# Patient Record
Sex: FEMALE | Race: WHITE | NOT HISPANIC OR LATINO | Employment: OTHER | URBAN - METROPOLITAN AREA
[De-identification: names, ages, dates, MRNs, and addresses within clinical notes are randomized per-mention and may not be internally consistent; named-entity substitution may affect disease eponyms.]

---

## 2017-10-11 ENCOUNTER — APPOINTMENT (OUTPATIENT)
Dept: LAB | Facility: CLINIC | Age: 77
End: 2017-10-11
Payer: MEDICARE

## 2017-10-11 ENCOUNTER — GENERIC CONVERSION - ENCOUNTER (OUTPATIENT)
Dept: OTHER | Facility: OTHER | Age: 77
End: 2017-10-11

## 2017-10-11 ENCOUNTER — TRANSCRIBE ORDERS (OUTPATIENT)
Dept: LAB | Facility: CLINIC | Age: 77
End: 2017-10-11

## 2017-10-11 ENCOUNTER — ALLSCRIPTS OFFICE VISIT (OUTPATIENT)
Dept: OTHER | Facility: OTHER | Age: 77
End: 2017-10-11

## 2017-10-11 DIAGNOSIS — I10 ESSENTIAL (PRIMARY) HYPERTENSION: ICD-10-CM

## 2017-10-11 DIAGNOSIS — H91.93 HEARING LOSS OF BOTH EARS: ICD-10-CM

## 2017-10-11 DIAGNOSIS — C50.919 MALIGNANT NEOPLASM OF FEMALE BREAST (HCC): ICD-10-CM

## 2017-10-11 DIAGNOSIS — K21.9 GASTRO-ESOPHAGEAL REFLUX DISEASE WITHOUT ESOPHAGITIS: ICD-10-CM

## 2017-10-11 DIAGNOSIS — D64.9 ANEMIA: ICD-10-CM

## 2017-10-11 DIAGNOSIS — R94.6 ABNORMAL RESULTS OF THYROID FUNCTION STUDIES: ICD-10-CM

## 2017-10-11 LAB
ALBUMIN SERPL BCP-MCNC: 3.4 G/DL (ref 3.5–5)
ALP SERPL-CCNC: 69 U/L (ref 46–116)
ALT SERPL W P-5'-P-CCNC: 13 U/L (ref 12–78)
ANION GAP SERPL CALCULATED.3IONS-SCNC: 6 MMOL/L (ref 4–13)
AST SERPL W P-5'-P-CCNC: 11 U/L (ref 5–45)
BASOPHILS # BLD AUTO: 0.01 THOUSANDS/ΜL (ref 0–0.1)
BASOPHILS NFR BLD AUTO: 0 % (ref 0–1)
BILIRUB SERPL-MCNC: 0.71 MG/DL (ref 0.2–1)
BUN SERPL-MCNC: 22 MG/DL (ref 5–25)
CALCIUM SERPL-MCNC: 8.8 MG/DL (ref 8.3–10.1)
CHLORIDE SERPL-SCNC: 107 MMOL/L (ref 100–108)
CO2 SERPL-SCNC: 29 MMOL/L (ref 21–32)
CREAT SERPL-MCNC: 0.7 MG/DL (ref 0.6–1.3)
EOSINOPHIL # BLD AUTO: 0.2 THOUSAND/ΜL (ref 0–0.61)
EOSINOPHIL NFR BLD AUTO: 3 % (ref 0–6)
ERYTHROCYTE [DISTWIDTH] IN BLOOD BY AUTOMATED COUNT: 15.4 % (ref 11.6–15.1)
GFR SERPL CREATININE-BSD FRML MDRD: 83 ML/MIN/1.73SQ M
GLUCOSE SERPL-MCNC: 84 MG/DL (ref 65–140)
HCT VFR BLD AUTO: 37.6 % (ref 34.8–46.1)
HGB BLD-MCNC: 11.7 G/DL (ref 11.5–15.4)
LYMPHOCYTES # BLD AUTO: 1.59 THOUSANDS/ΜL (ref 0.6–4.47)
LYMPHOCYTES NFR BLD AUTO: 20 % (ref 14–44)
MAGNESIUM SERPL-MCNC: 2.4 MG/DL (ref 1.6–2.6)
MCH RBC QN AUTO: 27.2 PG (ref 26.8–34.3)
MCHC RBC AUTO-ENTMCNC: 31.1 G/DL (ref 31.4–37.4)
MCV RBC AUTO: 87 FL (ref 82–98)
MONOCYTES # BLD AUTO: 0.77 THOUSAND/ΜL (ref 0.17–1.22)
MONOCYTES NFR BLD AUTO: 10 % (ref 4–12)
NEUTROPHILS # BLD AUTO: 5.23 THOUSANDS/ΜL (ref 1.85–7.62)
NEUTS SEG NFR BLD AUTO: 67 % (ref 43–75)
NRBC BLD AUTO-RTO: 0 /100 WBCS
PHOSPHATE SERPL-MCNC: 3.2 MG/DL (ref 2.3–4.1)
PLATELET # BLD AUTO: 243 THOUSANDS/UL (ref 149–390)
PMV BLD AUTO: 12 FL (ref 8.9–12.7)
POTASSIUM SERPL-SCNC: 4.3 MMOL/L (ref 3.5–5.3)
PROT SERPL-MCNC: 7.3 G/DL (ref 6.4–8.2)
RBC # BLD AUTO: 4.3 MILLION/UL (ref 3.81–5.12)
SODIUM SERPL-SCNC: 142 MMOL/L (ref 136–145)
TSH SERPL DL<=0.05 MIU/L-ACNC: 2.12 UIU/ML (ref 0.36–3.74)
WBC # BLD AUTO: 7.82 THOUSAND/UL (ref 4.31–10.16)

## 2017-10-11 PROCEDURE — 36415 COLL VENOUS BLD VENIPUNCTURE: CPT

## 2017-10-11 PROCEDURE — 83735 ASSAY OF MAGNESIUM: CPT

## 2017-10-11 PROCEDURE — 80053 COMPREHEN METABOLIC PANEL: CPT

## 2017-10-11 PROCEDURE — 84100 ASSAY OF PHOSPHORUS: CPT

## 2017-10-11 PROCEDURE — 84443 ASSAY THYROID STIM HORMONE: CPT

## 2017-10-11 PROCEDURE — 85025 COMPLETE CBC W/AUTO DIFF WBC: CPT

## 2017-10-13 NOTE — PROGRESS NOTES
Assessment  1  Breast cancer (174 9) (C50 919)   2  Heart Rhythm Irregular   3  GERD (gastroesophageal reflux disease) (530 81) (K21 9)   4  Benign essential hypertension (401 1) (I10)   5  Dental caries (521 00) (K02 9)   6  Abnormal thyroid stimulating hormone (TSH) level (790 6) (R94 6)   7  Anemia (285 9) (D64 9)   8  Bilateral hearing loss (389 9) (H91 93)   9  Never smoked tobacco (V49 89) (Z78 9)    Plan  Abnormal thyroid stimulating hormone (TSH) level, Benign essential hypertension,  Breast cancer, GERD (gastroesophageal reflux disease), Heart Rhythm Irregular    · (1) COMPREHENSIVE METABOLIC PANEL; Status:Active; Requested for:11Oct2017;    · (1) MAGNESIUM; Status:Active; Requested for:11Oct2017;    · (1) PHOSPHORUS; Status:Active; Requested for:11Oct2017;    · (1) TSH; Status:Active; Requested for:11Oct2017; Anemia    · (1) CBC/PLT/DIFF; Status:Active; Requested for:11Oct2017;   Bilateral hearing loss    · *1 - [facility] AUDIOLOGY Co-Management  *  Status: Active  Requested for:  [id number]  Care Summary provided  : Yes  Dental caries    · 1 - [facility] (ORAL SURGERY ( DENTIST ONLY ) ) Co-Management  *   Status: Hold For - Scheduling  Requested for: 25WHL7446  Care Summary provided  : Yes   · 2 - [facility] (MAXILLOFACIAL SURGERY ) Co-Management  *  Status: Hold  For - Scheduling  Requested for: 13LNU7475  Care Summary provided  : Yes  Heart Rhythm Irregular    · 3 - Ramin DAS, [facility] Cardiology Co-Management  *  Status: Hold For - Scheduling   Requested for: 12HRU5694  : [facility]  are Referring to a non[facility] Preferred Provider : Patient refused suggestion for      recommended provider  Care Summary provided  : Yes    Discussion/Summary  Discussion Summary:   1 : Breast cancer: Patient does have history of breast cancer  She did have a recent biopsy  Recommend that she continue follow up with Dr Angi Castorena    2 : Irregular heart rhythm: Patient's EKG did show PVCs, PACs, some other abnormalities  There was no sign of atrial fibrillation  Would recommend that she follow with Cardiology, as well as check laboratory studies  My question is whether not some of this is related to thyroid issues  She has had some thyroid issues, as witnessed by her being on Methimazole  GERD: Stable  On omeprazole  No current symptoms  Hypertension: Stable at the moment  Blood pressure today is reasonable  No changes  Dental caries: Patient has multiple dental caries, as well as some fractured teeth  Would recommend follow with Oral surgery, as well as dental  She should likely start with dental, then proceed to the oral surgeon after that  Abnormal thyroid-stimulating hormone: Patient did have methimazole as a medication when she came here  There is no testing that shows what the abnormal level was, but her son does relate that it was low  Check TSH, and re-evaluate after that  Anemia: Noted previously Per family report  Check CBC  Bilateral hearing loss: Patient does have a significant amount of hearing loss  Unfortunately, I am very concerned that this hearing loss represents her being isolated  She lives in [place] alone, and her son is in [place]  He will, been visit with her quite often, however it would be beneficial for her if she would have improved hearing, such as with amplification and possible filtering  Chief Complaint  Chief Complaint Free Text Note Form: New Patient  Pt is in the are from [place]  She has family along today who is concerned about her health and have many questions  kw      History of Present Illness  HPI: Patient is currently transitioning to this office for coordination of care  has had breast cancer, and recently developed a new nodule/lump  She did have a biopsy done at [facility]  recently  is also on metoprolol, omeprazole, amlodipine, methimazole  Unfortunately, there is no paperwork from her prior PCP to review with this   They were unsure what exactly was going on with that, i e  her son and her were not sure about all of the medications and recent blood work  prior doc states that she is rapid a-fib and needed cardiology  was anemic 6 months ago  Review of Systems  Complete-Female:   Constitutional: as noted in HPI  Eyes: glasses  ENT: hearing loss  Cardiovascular: No complaints of slow heart rate, no fast heart rate, no chest pain, no palpitations, no leg claudication, no lower extremity edema  Respiratory: as noted in HPI  Gastrointestinal: No complaints of abdominal pain, no constipation, no nausea or vomiting, no diarrhea, no bloody stools  Genitourinary: occasional irritation, but uses a cream    Musculoskeletal: No complaints of arthralgias, no myalgias, no joint swelling or stiffness, no limb pain or swelling  Integumentary: No complaints of skin rash or lesions, no itching, no skin wounds, no breast pain or lump  Neurological: No complaints of headache, no confusion, no convulsions, no numbness, no dizziness or fainting, no tingling, no limb weakness, no difficulty walking  Psychiatric: Not suicidal, no sleep disturbance, no anxiety or depression, no change in personality, no emotional problems  Endocrine: No complaints of proptosis, no hot flashes, no muscle weakness, no deepening of the voice, no feelings of weakness  Hematologic/Lymphatic: No complaints of swollen glands, no swollen glands in the neck, does not bleed easily, does not bruise easily  Past Medical History  Active Problems And Past Medical History Reviewed: The active problems and past medical history were reviewed and updated today  Surgical History  1  History of Biopsy Breast Percutaneous Needle Core   2  History of Breast Surgery Lumpectomy   3  History of Gynecologic Services   4  History of Hip Replacement  Surgical History Reviewed: The surgical history was reviewed and updated today  Family History  Father    1   Family history of Old age  Family History Reviewed: The family history was reviewed and updated today  Social History   · Born in [facility]   · Does not use illicit drugs (L35 45) (Z78 9)   · Never smoked tobacco (V49 89) (Z78 9)   · Primary spoken language [facility]   · Rarely consumes alcohol (V49 89) (Z78 9)   ·  (V61 07) (Z63 4)  Social History Reviewed: The social history was reviewed and updated today  The social history was reviewed and is unchanged  Current Meds   1  AmLODIPine Besylate 5 MG Oral Tablet; Therapy: 65QCA1969 to Recorded   2  Caltrate 600+D Plus Minerals 600-800 MG-UNIT Oral Tablet Chewable; Therapy: 36SLP4337 to Recorded   3  MethIMAzole 5 MG Oral Tablet; Therapy: 41KJK4284 to Recorded   4  Metoprolol Tartrate 50 MG Oral Tablet; Therapy: 66KUJ1603 to Recorded   5  Omeprazole 20 MG Oral Capsule Delayed Release; Therapy: 34FWU7358 to Recorded    Allergies  1  No Known Drug Allergies    Vitals  Vital Signs    Recorded: 25APZ3496 12:07PM   Heart Rate 80   Respiration 14   Systolic 323   Diastolic 66   Height 5 ft 3 in   Weight 107 lb 2 oz   BMI Calculated 18 98   BSA Calculated 1 48     Physical Exam    Constitutional   General appearance: No acute distress, well appearing and well nourished  Eyes   Conjunctiva and lids: No swelling, erythema or discharge  Pupils and irises: Equal, round and reactive to light  Ears, Nose, Mouth, and Throat   External inspection of ears and nose: Normal     Oropharynx: Abnormal  -(poor dentition)   Pulmonary   Respiratory effort: No increased work of breathing or signs of respiratory distress  Auscultation of lungs: Clear to auscultation  Cardiovascular   Auscultation of heart: Abnormal   The heart rate was normal  The rhythm was irregularly irregular  Heart sounds: normal S1,-normal S2-and-no gallop heard  no murmurs were heard     Examination of extremities for edema and/or varicosities: Normal     Carotid pulses: Normal     Abdomen   Abdomen: Non-tender, no masses  Liver and spleen: No hepatomegaly or splenomegaly  Musculoskeletal   Gait and station: Normal     Digits and nails: Normal without clubbing or cyanosis  Inspection/palpation of joints, bones, and muscles: Normal     Neurologic   Cranial nerves: Cranial nerves 2-12 intact  Reflexes: 2+ and symmetric  Sensation: No sensory loss  Psychiatric   Orientation to person, place, and time: Normal     Mood and affect: Normal          Signatures   Electronically signed by :  NADYA Birch ; Oct 11 2017  1:33PM EST                       (Author)

## 2017-11-15 ENCOUNTER — GENERIC CONVERSION - ENCOUNTER (OUTPATIENT)
Dept: OTHER | Facility: OTHER | Age: 77
End: 2017-11-15

## 2018-01-10 NOTE — RESULT NOTES
Verified Results  (1) CBC/PLT/DIFF 11Oct2017 01:48PM Jayne Najjar Order Number: [id number]     Test Name Result Flag Reference   WBC COUNT 7 82 Thousand/uL  4 31-10 16   RBC COUNT 4 30 Million/uL  3 81-5 12   HEMOGLOBIN 11 7 g/dL  11 5-15 4   HEMATOCRIT 37 6 %  34 8-46  1   MCV 87 fL  82-98   MCH 27 2 pg  26 8-34 3   MCHC 31 1 g/dL L 31 4-37 4   RDW 15 4 % H 11 6-15 1   MPV 12 0 fL  8 9-12 7   PLATELET COUNT 520 Thousands/uL  149-390   nRBC AUTOMATED 0 /100 WBCs     NEUTROPHILS RELATIVE PERCENT 67 %  43-75   LYMPHOCYTES RELATIVE PERCENT 20 %  14-44   MONOCYTES RELATIVE PERCENT 10 %  4-12   EOSINOPHILS RELATIVE PERCENT 3 %  0-6   BASOPHILS RELATIVE PERCENT 0 %  0-1   NEUTROPHILS ABSOLUTE COUNT 5 23 Thousands/? ??L  1 85-7 62   LYMPHOCYTES ABSOLUTE COUNT 1 59 Thousands/? ??L  0 60-4 47   MONOCYTES ABSOLUTE COUNT 0 77 Thousand/? ??L  0 17-1 22   EOSINOPHILS ABSOLUTE COUNT 0 20 Thousand/? ??L  0 00-0 61   BASOPHILS ABSOLUTE COUNT 0 01 Thousands/? ??L  0 00-0 10     (1) TSH 11Oct2017 01:48PM S[facility] Order Number: [id number]     Test Name Result Flag Reference   TSH 2 120 uIU/mL  0 358-3 740   Patients undergoing fluorescein dye angiography may retain small amounts of fluorescein in the body for 48-72 hours post procedure  Samples containing fluorescein can produce falsely depressed TSH values  If the patient had this procedure,a specimen should be resubmitted post fluorescein clearance  The recommended reference ranges for TSH during pregnancy are as follows:  First trimester 0 1 to 2 5 uIU/mL  Second trimester  0 2 to 3 0 uIU/mL  Third trimester 0 3 to 3 0 uIU/m     (1) COMPREHENSIVE METABOLIC PANEL 34RFC2868 09:30OC [employer] Order Number: [id number]     Test Name Result Flag Reference   GLUCOSE,RANDM 84 mg/dL     If the patient is fasting, the ADA then defines impaired fasting glucose as > 100 mg/dL and diabetes as > or equal to 123 mg/dL    Specimen collection should occur prior to Sulfasalazine administration due to the potential for falsely depressed results  Specimen collection should occur prior to Sulfapyridine administration due to the potential for falsely elevated results  SODIUM 142 mmol/L  136-145   POTASSIUM 4 3 mmol/L  3 5-5 3   CHLORIDE 107 mmol/L  100-108   CARBON DIOXIDE 29 mmol/L  21-32   ANION GAP (CALC) 6 mmol/L  4-13   BLOOD UREA NITROGEN 22 mg/dL  5-25   CREATININE 0 70 mg/dL  0 60-1 30   Standardized to IDMS reference method   CALCIUM 8 8 mg/dL  8 3-10 1   BILI, TOTAL 0 71 mg/dL  0 20-1 00   ALK PHOSPHATAS 69 U/L     ALT (SGPT) 13 U/L  12-78   Specimen collection should occur prior to Sulfasalazine and/or Sulfapyridine administration due to the potential for falsely depressed results  AST(SGOT) 11 U/L  5-45   Specimen collection should occur prior to Sulfasalazine administration due to the potential for falsely depressed results  ALBUMIN 3 4 g/dL L 3 5-5 0   TOTAL PROTEIN 7 3 g/dL  6 4-8 2   eGFR 83 ml/min/1 73sq m     National Kidney Disease Education Program recommendations are as follows:  GFR calculation is accurate only with a steady state creatinine  Chronic Kidney disease less than 60 ml/min/1 73 sq  meters  Kidney failure less than 15 ml/min/1 73 sq  meters       (1) MAGNESIUM [id number] 01:48PM [facility] Order Number: [id number]     Test Name Result Flag Reference   MAGNESIUM 2 4 mg/dL  1 6-2 6     (1) PHOSPHORUS [id number] 01:48PM Magali Janet Order Number: [id number]     Test Name Result Flag Reference   PHOSPHORUS 3 2 mg/dL  2 3-4 1

## 2018-01-13 VITALS
HEIGHT: 63 IN | BODY MASS INDEX: 18.98 KG/M2 | WEIGHT: 107.13 LBS | DIASTOLIC BLOOD PRESSURE: 66 MMHG | HEART RATE: 80 BPM | SYSTOLIC BLOOD PRESSURE: 124 MMHG | RESPIRATION RATE: 14 BRPM

## 2018-02-08 ENCOUNTER — TELEPHONE (OUTPATIENT)
Dept: FAMILY MEDICINE CLINIC | Facility: CLINIC | Age: 78
End: 2018-02-08

## 2018-02-08 NOTE — TELEPHONE ENCOUNTER
Just an FYI for this patient's appointment tomorrow (2/9/18), one of the nurses stated that when she was in the hospital they gave her Trazodone to help her sleep  She is out of the hospital & not taking it anymore  But patient is saying that she is complaining of sleep problems  The nurse just wanted to let us know, to use this as a possible option  Thank you!

## 2018-02-09 ENCOUNTER — OFFICE VISIT (OUTPATIENT)
Dept: FAMILY MEDICINE CLINIC | Facility: CLINIC | Age: 78
End: 2018-02-09
Payer: MEDICARE

## 2018-02-09 ENCOUNTER — TRANSCRIBE ORDERS (OUTPATIENT)
Dept: LAB | Facility: CLINIC | Age: 78
End: 2018-02-09

## 2018-02-09 ENCOUNTER — LAB (OUTPATIENT)
Dept: LAB | Facility: CLINIC | Age: 78
End: 2018-02-09
Payer: MEDICARE

## 2018-02-09 VITALS
DIASTOLIC BLOOD PRESSURE: 58 MMHG | BODY MASS INDEX: 19.38 KG/M2 | WEIGHT: 109.4 LBS | HEIGHT: 63 IN | HEART RATE: 80 BPM | SYSTOLIC BLOOD PRESSURE: 100 MMHG

## 2018-02-09 DIAGNOSIS — I10 BENIGN ESSENTIAL HYPERTENSION: Primary | ICD-10-CM

## 2018-02-09 DIAGNOSIS — F51.04 PSYCHOPHYSIOLOGICAL INSOMNIA: ICD-10-CM

## 2018-02-09 DIAGNOSIS — K21.9 GASTROESOPHAGEAL REFLUX DISEASE WITHOUT ESOPHAGITIS: ICD-10-CM

## 2018-02-09 DIAGNOSIS — S72.001D CLOSED FRACTURE OF RIGHT HIP WITH ROUTINE HEALING: ICD-10-CM

## 2018-02-09 DIAGNOSIS — C50.512 MALIGNANT NEOPLASM OF LOWER-OUTER QUADRANT OF LEFT FEMALE BREAST, UNSPECIFIED ESTROGEN RECEPTOR STATUS (HCC): Primary | ICD-10-CM

## 2018-02-09 DIAGNOSIS — C50.919 MALIGNANT NEOPLASM OF FEMALE BREAST, UNSPECIFIED ESTROGEN RECEPTOR STATUS, UNSPECIFIED LATERALITY, UNSPECIFIED SITE OF BREAST (HCC): ICD-10-CM

## 2018-02-09 PROBLEM — I49.9 IRREGULAR HEART RHYTHM: Status: ACTIVE | Noted: 2017-10-11

## 2018-02-09 PROBLEM — D64.9 ANEMIA: Status: ACTIVE | Noted: 2017-10-11

## 2018-02-09 PROBLEM — R79.89 ABNORMAL THYROID STIMULATING HORMONE (TSH) LEVEL: Status: ACTIVE | Noted: 2017-10-11

## 2018-02-09 PROBLEM — G47.00 INSOMNIA: Status: ACTIVE | Noted: 2018-02-09

## 2018-02-09 PROBLEM — H91.93 BILATERAL HEARING LOSS: Status: ACTIVE | Noted: 2017-10-11

## 2018-02-09 PROBLEM — K02.9 DENTAL CARIES: Status: ACTIVE | Noted: 2017-10-11

## 2018-02-09 LAB
ALBUMIN SERPL BCP-MCNC: 3.2 G/DL (ref 3.5–5)
ALP SERPL-CCNC: 55 U/L (ref 46–116)
ALT SERPL W P-5'-P-CCNC: 11 U/L (ref 12–78)
ANION GAP SERPL CALCULATED.3IONS-SCNC: 5 MMOL/L (ref 4–13)
AST SERPL W P-5'-P-CCNC: 11 U/L (ref 5–45)
BASOPHILS # BLD AUTO: 0.02 THOUSANDS/ΜL (ref 0–0.1)
BASOPHILS NFR BLD AUTO: 0 % (ref 0–1)
BILIRUB SERPL-MCNC: 0.38 MG/DL (ref 0.2–1)
BUN SERPL-MCNC: 24 MG/DL (ref 5–25)
CALCIUM SERPL-MCNC: 8.9 MG/DL (ref 8.3–10.1)
CHLORIDE SERPL-SCNC: 107 MMOL/L (ref 100–108)
CO2 SERPL-SCNC: 30 MMOL/L (ref 21–32)
CREAT SERPL-MCNC: 0.78 MG/DL (ref 0.6–1.3)
CRP SERPL QL: <3 MG/L
EOSINOPHIL # BLD AUTO: 0.24 THOUSAND/ΜL (ref 0–0.61)
EOSINOPHIL NFR BLD AUTO: 3 % (ref 0–6)
ERYTHROCYTE [DISTWIDTH] IN BLOOD BY AUTOMATED COUNT: 14.9 % (ref 11.6–15.1)
ERYTHROCYTE [SEDIMENTATION RATE] IN BLOOD: 38 MM/HOUR (ref 0–20)
GFR SERPL CREATININE-BSD FRML MDRD: 73 ML/MIN/1.73SQ M
GLUCOSE SERPL-MCNC: 97 MG/DL (ref 65–140)
HCT VFR BLD AUTO: 35.4 % (ref 34.8–46.1)
HGB BLD-MCNC: 11.1 G/DL (ref 11.5–15.4)
LYMPHOCYTES # BLD AUTO: 1.7 THOUSANDS/ΜL (ref 0.6–4.47)
LYMPHOCYTES NFR BLD AUTO: 21 % (ref 14–44)
MCH RBC QN AUTO: 28.5 PG (ref 26.8–34.3)
MCHC RBC AUTO-ENTMCNC: 31.4 G/DL (ref 31.4–37.4)
MCV RBC AUTO: 91 FL (ref 82–98)
MONOCYTES # BLD AUTO: 0.76 THOUSAND/ΜL (ref 0.17–1.22)
MONOCYTES NFR BLD AUTO: 9 % (ref 4–12)
NEUTROPHILS # BLD AUTO: 5.55 THOUSANDS/ΜL (ref 1.85–7.62)
NEUTS SEG NFR BLD AUTO: 67 % (ref 43–75)
NRBC BLD AUTO-RTO: 0 /100 WBCS
PLATELET # BLD AUTO: 269 THOUSANDS/UL (ref 149–390)
PMV BLD AUTO: 11.3 FL (ref 8.9–12.7)
POTASSIUM SERPL-SCNC: 3.9 MMOL/L (ref 3.5–5.3)
PROT SERPL-MCNC: 6.8 G/DL (ref 6.4–8.2)
RBC # BLD AUTO: 3.89 MILLION/UL (ref 3.81–5.12)
SODIUM SERPL-SCNC: 142 MMOL/L (ref 136–145)
WBC # BLD AUTO: 8.3 THOUSAND/UL (ref 4.31–10.16)

## 2018-02-09 PROCEDURE — 36415 COLL VENOUS BLD VENIPUNCTURE: CPT

## 2018-02-09 PROCEDURE — 86140 C-REACTIVE PROTEIN: CPT

## 2018-02-09 PROCEDURE — 80053 COMPREHEN METABOLIC PANEL: CPT

## 2018-02-09 PROCEDURE — 99214 OFFICE O/P EST MOD 30 MIN: CPT | Performed by: FAMILY MEDICINE

## 2018-02-09 PROCEDURE — 85652 RBC SED RATE AUTOMATED: CPT

## 2018-02-09 PROCEDURE — 85025 COMPLETE CBC W/AUTO DIFF WBC: CPT

## 2018-02-09 RX ORDER — ESTRADIOL 0.1 MG/G
CREAM VAGINAL
Qty: 42.5 G | Refills: 0 | Status: SHIPPED | OUTPATIENT
Start: 2018-02-09 | End: 2018-08-24 | Stop reason: ALTCHOICE

## 2018-02-09 RX ORDER — ASCORBIC ACID 250 MG
250 TABLET ORAL DAILY
Refills: 0
Start: 2018-02-09

## 2018-02-09 RX ORDER — FERROUS SULFATE TAB EC 324 MG (65 MG FE EQUIVALENT) 324 (65 FE) MG
324 TABLET DELAYED RESPONSE ORAL
Refills: 0
Start: 2018-02-09

## 2018-02-09 RX ORDER — TAMOXIFEN CITRATE 20 MG/1
20 TABLET ORAL DAILY
Refills: 0 | COMMUNITY
Start: 2018-01-06 | End: 2018-08-16 | Stop reason: SDUPTHER

## 2018-02-09 RX ORDER — TRAZODONE HYDROCHLORIDE 50 MG/1
50 TABLET ORAL
Refills: 0 | Status: CANCELLED
Start: 2018-02-09

## 2018-02-09 RX ORDER — RANITIDINE 150 MG/1
150 CAPSULE ORAL 2 TIMES DAILY
Qty: 180 CAPSULE | Refills: 3 | Status: SHIPPED | OUTPATIENT
Start: 2018-02-09 | End: 2021-03-08 | Stop reason: ALTCHOICE

## 2018-02-09 RX ORDER — CA/D3/MAG OX/ZINC/COP/MANG/BOR 600 MG-800
TABLET,CHEWABLE ORAL
COMMUNITY
Start: 2017-10-11

## 2018-02-09 RX ORDER — METHIMAZOLE 10 MG/1
10 TABLET ORAL EVERY MORNING
Refills: 0 | COMMUNITY
Start: 2018-02-03 | End: 2018-03-16 | Stop reason: SDUPTHER

## 2018-02-09 RX ORDER — TRAZODONE HYDROCHLORIDE 50 MG/1
50 TABLET ORAL
Qty: 30 TABLET | Refills: 2 | Status: SHIPPED | OUTPATIENT
Start: 2018-02-09 | End: 2018-05-11 | Stop reason: SDUPTHER

## 2018-02-09 RX ORDER — METOPROLOL SUCCINATE 25 MG/1
TABLET, EXTENDED RELEASE ORAL
Refills: 0 | COMMUNITY
Start: 2018-02-01 | End: 2018-03-16 | Stop reason: SDUPTHER

## 2018-02-09 NOTE — ASSESSMENT & PLAN NOTE
Patient seems to be doing quite well after her hip replacement  At this point, I would not recommend any changes other than continue her current treatments  In the future we are going to need to check vitamin-D level, as well as DEXA scan

## 2018-02-09 NOTE — ASSESSMENT & PLAN NOTE
Patient has increasing amount of gas and belching  Recommend trial of Zantac  Her son mentioned that she tried omeprazole before and it did not seem to help  Also, with the recent hip fracture am concerned that omeprazole would not be the right option for her

## 2018-02-09 NOTE — ASSESSMENT & PLAN NOTE
Patient does appear to be having some insomnia issues  She was on trazodone from discharge from hospital in [place], but it did seem to help her quite a bit with the sleep  Since then, she has not been able to sleep as well  Will renewed today, and I would also agree that it would help as far as depression anxiety symptoms

## 2018-03-16 DIAGNOSIS — I10 BENIGN ESSENTIAL HYPERTENSION: ICD-10-CM

## 2018-03-16 DIAGNOSIS — R79.89 ABNORMAL THYROID STIMULATING HORMONE (TSH) LEVEL: Primary | ICD-10-CM

## 2018-03-16 RX ORDER — METOPROLOL SUCCINATE 25 MG/1
25 TABLET, EXTENDED RELEASE ORAL EVERY MORNING
Qty: 30 TABLET | Refills: 5 | Status: SHIPPED | OUTPATIENT
Start: 2018-03-16

## 2018-03-16 RX ORDER — METHIMAZOLE 10 MG/1
10 TABLET ORAL EVERY MORNING
Qty: 30 TABLET | Refills: 5 | Status: SHIPPED | OUTPATIENT
Start: 2018-03-16 | End: 2018-05-11 | Stop reason: SDUPTHER

## 2018-03-16 RX ORDER — NYSTATIN 100000 U/G
OINTMENT TOPICAL 2 TIMES DAILY
Qty: 30 G | Refills: 0 | Status: SHIPPED | OUTPATIENT
Start: 2018-03-16

## 2018-03-16 NOTE — TELEPHONE ENCOUNTER
Pts son is re [facility] [place] a refill for pts Nystatin Cream which I do not see this on her med sheet, If we can refill what directions and how many?

## 2018-05-11 DIAGNOSIS — F51.04 PSYCHOPHYSIOLOGICAL INSOMNIA: ICD-10-CM

## 2018-05-11 DIAGNOSIS — R79.89 ABNORMAL THYROID STIMULATING HORMONE (TSH) LEVEL: ICD-10-CM

## 2018-05-11 RX ORDER — TRAZODONE HYDROCHLORIDE 50 MG/1
TABLET ORAL
Qty: 30 TABLET | Refills: 0 | Status: SHIPPED | OUTPATIENT
Start: 2018-05-11 | End: 2018-06-17 | Stop reason: SDUPTHER

## 2018-05-11 RX ORDER — METHIMAZOLE 10 MG/1
TABLET ORAL
Qty: 30 TABLET | Refills: 0 | Status: SHIPPED | OUTPATIENT
Start: 2018-05-11 | End: 2021-01-24 | Stop reason: SDUPTHER

## 2018-06-17 DIAGNOSIS — F51.04 PSYCHOPHYSIOLOGICAL INSOMNIA: ICD-10-CM

## 2018-06-18 RX ORDER — TRAZODONE HYDROCHLORIDE 50 MG/1
TABLET ORAL
Qty: 30 TABLET | Refills: 0 | Status: SHIPPED | OUTPATIENT
Start: 2018-06-18 | End: 2018-09-21 | Stop reason: SDUPTHER

## 2018-08-07 PROBLEM — Z17.0 MALIGNANT NEOPLASM OF LOWER-OUTER QUADRANT OF LEFT BREAST OF FEMALE, ESTROGEN RECEPTOR POSITIVE (HCC): Chronic | Status: ACTIVE | Noted: 2017-10-11

## 2018-08-07 PROBLEM — C50.512 MALIGNANT NEOPLASM OF LOWER-OUTER QUADRANT OF LEFT BREAST OF FEMALE, ESTROGEN RECEPTOR POSITIVE (HCC): Chronic | Status: ACTIVE | Noted: 2017-10-11

## 2018-08-07 PROBLEM — M81.0 AGE-RELATED OSTEOPOROSIS WITHOUT CURRENT PATHOLOGICAL FRACTURE: Chronic | Status: ACTIVE | Noted: 2018-08-07

## 2018-08-16 DIAGNOSIS — Z17.0 MALIGNANT NEOPLASM OF LOWER-OUTER QUADRANT OF LEFT BREAST OF FEMALE, ESTROGEN RECEPTOR POSITIVE (HCC): Primary | ICD-10-CM

## 2018-08-16 DIAGNOSIS — C50.512 MALIGNANT NEOPLASM OF LOWER-OUTER QUADRANT OF LEFT BREAST OF FEMALE, ESTROGEN RECEPTOR POSITIVE (HCC): Primary | ICD-10-CM

## 2018-08-16 RX ORDER — TAMOXIFEN CITRATE 20 MG/1
TABLET ORAL
Qty: 90 TABLET | Refills: 4 | Status: SHIPPED | OUTPATIENT
Start: 2018-08-16 | End: 2019-04-08 | Stop reason: SDUPTHER

## 2018-08-22 ENCOUNTER — TRANSCRIBE ORDERS (OUTPATIENT)
Dept: LAB | Facility: CLINIC | Age: 78
End: 2018-08-22

## 2018-08-22 ENCOUNTER — APPOINTMENT (OUTPATIENT)
Dept: LAB | Facility: CLINIC | Age: 78
End: 2018-08-22
Payer: MEDICARE

## 2018-08-22 DIAGNOSIS — C50.512 MALIGNANT NEOPLASM OF LOWER-OUTER QUADRANT OF LEFT FEMALE BREAST, UNSPECIFIED ESTROGEN RECEPTOR STATUS (HCC): Primary | ICD-10-CM

## 2018-08-22 LAB
ALBUMIN SERPL BCP-MCNC: 3.1 G/DL (ref 3.5–5)
ALP SERPL-CCNC: 35 U/L (ref 46–116)
ALT SERPL W P-5'-P-CCNC: 10 U/L (ref 12–78)
ANION GAP SERPL CALCULATED.3IONS-SCNC: 8 MMOL/L (ref 4–13)
AST SERPL W P-5'-P-CCNC: 9 U/L (ref 5–45)
BASOPHILS # BLD AUTO: 0.03 THOUSANDS/ΜL (ref 0–0.1)
BASOPHILS NFR BLD AUTO: 0 % (ref 0–1)
BILIRUB SERPL-MCNC: 0.57 MG/DL (ref 0.2–1)
BUN SERPL-MCNC: 22 MG/DL (ref 5–25)
CALCIUM SERPL-MCNC: 8.5 MG/DL (ref 8.3–10.1)
CHLORIDE SERPL-SCNC: 112 MMOL/L (ref 100–108)
CO2 SERPL-SCNC: 27 MMOL/L (ref 21–32)
CREAT SERPL-MCNC: 0.82 MG/DL (ref 0.6–1.3)
CRP SERPL QL: <3 MG/L
EOSINOPHIL # BLD AUTO: 0.2 THOUSAND/ΜL (ref 0–0.61)
EOSINOPHIL NFR BLD AUTO: 3 % (ref 0–6)
ERYTHROCYTE [DISTWIDTH] IN BLOOD BY AUTOMATED COUNT: 14.2 % (ref 11.6–15.1)
ERYTHROCYTE [SEDIMENTATION RATE] IN BLOOD: 26 MM/HOUR (ref 0–20)
FERRITIN SERPL-MCNC: 256 NG/ML (ref 8–388)
GFR SERPL CREATININE-BSD FRML MDRD: 68 ML/MIN/1.73SQ M
GLUCOSE SERPL-MCNC: 104 MG/DL (ref 65–140)
HCT VFR BLD AUTO: 35.2 % (ref 34.8–46.1)
HGB BLD-MCNC: 10.7 G/DL (ref 11.5–15.4)
IMM GRANULOCYTES # BLD AUTO: 0.03 THOUSAND/UL (ref 0–0.2)
IMM GRANULOCYTES NFR BLD AUTO: 0 % (ref 0–2)
IRON SATN MFR SERPL: 40 %
IRON SERPL-MCNC: 81 UG/DL (ref 50–170)
LDH SERPL-CCNC: 148 U/L (ref 81–234)
LYMPHOCYTES # BLD AUTO: 1.35 THOUSANDS/ΜL (ref 0.6–4.47)
LYMPHOCYTES NFR BLD AUTO: 18 % (ref 14–44)
MCH RBC QN AUTO: 28.8 PG (ref 26.8–34.3)
MCHC RBC AUTO-ENTMCNC: 30.4 G/DL (ref 31.4–37.4)
MCV RBC AUTO: 95 FL (ref 82–98)
MONOCYTES # BLD AUTO: 0.76 THOUSAND/ΜL (ref 0.17–1.22)
MONOCYTES NFR BLD AUTO: 10 % (ref 4–12)
NEUTROPHILS # BLD AUTO: 5.12 THOUSANDS/ΜL (ref 1.85–7.62)
NEUTS SEG NFR BLD AUTO: 69 % (ref 43–75)
NRBC BLD AUTO-RTO: 0 /100 WBCS
PLATELET # BLD AUTO: 194 THOUSANDS/UL (ref 149–390)
PMV BLD AUTO: 11.9 FL (ref 8.9–12.7)
POTASSIUM SERPL-SCNC: 4 MMOL/L (ref 3.5–5.3)
PROT SERPL-MCNC: 6.6 G/DL (ref 6.4–8.2)
RBC # BLD AUTO: 3.71 MILLION/UL (ref 3.81–5.12)
SODIUM SERPL-SCNC: 147 MMOL/L (ref 136–145)
TIBC SERPL-MCNC: 205 UG/DL (ref 250–450)
VIT B12 SERPL-MCNC: 546 PG/ML (ref 100–900)
WBC # BLD AUTO: 7.49 THOUSAND/UL (ref 4.31–10.16)

## 2018-08-22 PROCEDURE — 36415 COLL VENOUS BLD VENIPUNCTURE: CPT

## 2018-08-22 PROCEDURE — 82728 ASSAY OF FERRITIN: CPT

## 2018-08-22 PROCEDURE — 80053 COMPREHEN METABOLIC PANEL: CPT

## 2018-08-22 PROCEDURE — 85025 COMPLETE CBC W/AUTO DIFF WBC: CPT

## 2018-08-22 PROCEDURE — 86140 C-REACTIVE PROTEIN: CPT

## 2018-08-22 PROCEDURE — 83550 IRON BINDING TEST: CPT

## 2018-08-22 PROCEDURE — 83615 LACTATE (LD) (LDH) ENZYME: CPT

## 2018-08-22 PROCEDURE — 82607 VITAMIN B-12: CPT

## 2018-08-22 PROCEDURE — 85652 RBC SED RATE AUTOMATED: CPT

## 2018-08-22 PROCEDURE — 83918 ORGANIC ACIDS TOTAL QUANT: CPT

## 2018-08-22 PROCEDURE — 83540 ASSAY OF IRON: CPT

## 2018-08-23 ENCOUNTER — HOSPITAL ENCOUNTER (OUTPATIENT)
Dept: INFUSION CENTER | Facility: HOSPITAL | Age: 78
Discharge: HOME | End: 2018-08-23

## 2018-08-24 ENCOUNTER — HOSPITAL ENCOUNTER (OUTPATIENT)
Dept: INFUSION CENTER | Facility: HOSPITAL | Age: 78
Discharge: HOME | End: 2018-08-24
Payer: MEDICARE

## 2018-08-24 ENCOUNTER — OFFICE VISIT (OUTPATIENT)
Dept: HEMATOLOGY ONCOLOGY | Facility: CLINIC | Age: 78
End: 2018-08-24
Payer: MEDICARE

## 2018-08-24 VITALS
DIASTOLIC BLOOD PRESSURE: 80 MMHG | HEIGHT: 60 IN | BODY MASS INDEX: 23.36 KG/M2 | OXYGEN SATURATION: 99 % | SYSTOLIC BLOOD PRESSURE: 138 MMHG | TEMPERATURE: 98.7 F | WEIGHT: 119 LBS | RESPIRATION RATE: 18 BRPM | HEART RATE: 62 BPM

## 2018-08-24 DIAGNOSIS — Z17.0 MALIGNANT NEOPLASM OF LOWER-OUTER QUADRANT OF LEFT BREAST OF FEMALE, ESTROGEN RECEPTOR POSITIVE (HCC): Primary | Chronic | ICD-10-CM

## 2018-08-24 DIAGNOSIS — D64.9 ANEMIA, UNSPECIFIED TYPE: ICD-10-CM

## 2018-08-24 DIAGNOSIS — C50.512 MALIGNANT NEOPLASM OF LOWER-OUTER QUADRANT OF LEFT BREAST OF FEMALE, ESTROGEN RECEPTOR POSITIVE (HCC): Primary | Chronic | ICD-10-CM

## 2018-08-24 PROCEDURE — 99213 OFFICE O/P EST LOW 20 MIN: CPT | Performed by: INTERNAL MEDICINE

## 2018-08-24 PROCEDURE — 96401 CHEMO ANTI-NEOPL SQ/IM: CPT

## 2018-08-24 RX ADMIN — DENOSUMAB 60 MG: 60 INJECTION SUBCUTANEOUS at 13:06

## 2018-08-24 NOTE — PROGRESS NOTES
Patient tolerated Prolia to right upper arm sq without complications  Patient calcium 8 5 from 8-22-18, dexa done 2-16-18  Patient to see Dr Kai Moreno today and will sched next appt at that time

## 2018-08-24 NOTE — PROGRESS NOTES
Hematology/Oncology Outpatient Follow-up  Duane Skillern 77 y o  female 1940 [id number]    Date:  8/24/2018      Assessment and Plan:  1  Malignant neoplasm of lower-outer quadrant of left breast of female, estrogen receptor positive ([address] )  Patient is tolerating tamoxifen relatively well without any hint of progression of the mass in the left breast according to the most recent ultrasound done at the breast surgeons office  The patient will be continue on the current treatment without any changes we will continue with the Prolia injection on a every 6 months basis  - CBC and differential; Future  - Comprehensive metabolic panel; Future  - Vitamin D 25 hydroxy    2  Anemia, unspecified type  She does not seem to be iron deficient vitamin B12 deficient  She was asked to follow up with her primary care physician regarding her anemia   - CBC and differential; Future  - Comprehensive metabolic panel; Future      HPI:  The patient came in today for a follow-up visit  She continues to be on the tamoxifen on a daily basis without any significant side effects  She was recently seen by her breast surgeon who performed an ultrasound of the left breast   The mass at the 5 o'clock position seems to be about the same size at 1 cm according to the son  She had a bone density scan in February 2018 which showed osteoporosis of the lumbar spine and forearm  She takes vitamin-D supplements on a daily basis  She had a limited workup for her mild anemia which showed normal iron panel and vitamin B12 level  She denied bleeding from any site  Oncology History    Patient has a remote history of left breast cancer diagnosed 1998 status post lumpectomy, adjuvant radiation, and 5 years worth of endocrine therapy with tamoxifen  Her family history seems to be negative for breast cancer      She was found to have a lump on the left breast and mammogram was done on August 2, 2017, which showed a 1 2 cm lesion at 5 o'clock about 3 cm from the nipple  Ultrasound was done which confirmed the abnormal findings  Ultrasound-guided core biopsy was done October 6, 2017, the pathology revealed high-grade 3 infiltrating ductal carcinoma  The tumor size of the core biopsy measured 1 0 cm with prominent in situ component without lymphovascular invasion  The ER was 98% positive, IA 20% positive, her 2 Марина negative by FISH  The patient was evaluated for surgical approach of her left breast cancer and was found not to be a candidate for surgical intervention due to her comorbidities  She was started on tamoxifen in November 2017 which she is tolerating pretty well  Patient  DEXA scan 02/16/2018 which showed osteoporosis of the lumbar spine and the left forearm  The hips are not assessed due to bilateral hip replacements  She was started on Prolia 60 mg subcu every 6 months on 02/22/2018  Malignant neoplasm of lower-outer quadrant of left breast of female, estrogen receptor positive ([facility] [address] )    10/6/2017 Initial Diagnosis     Malignant neoplasm of lower-outer quadrant of left breast of female, estrogen receptor positive ([facility] [address] )         11/2017 -  Hormone Therapy     Started tamoxifen  (was also on tamoxifen in the past 5771-7576)            Interval history:    ROS: Review of Systems   Constitutional: Negative for activity change, appetite change, chills, diaphoresis, fatigue, fever and unexpected weight change  HENT: Negative for congestion, dental problem, ear discharge, ear pain, facial swelling, hearing loss, mouth sores, nosebleeds, postnasal drip, sore throat, tinnitus and trouble swallowing  Eyes: Negative for discharge, redness, itching and visual disturbance  Respiratory: Negative for cough, chest tightness, shortness of breath and wheezing  Cardiovascular: Negative for chest pain, palpitations and leg swelling     Gastrointestinal: Negative for abdominal distention, abdominal pain, anal bleeding, blood in stool, constipation, diarrhea, nausea and vomiting  Genitourinary: Negative for difficulty urinating, dysuria, flank pain, frequency, hematuria and urgency  Musculoskeletal: Negative for arthralgias, back pain, gait problem, joint swelling, myalgias and neck pain  Skin: Negative for color change, pallor, rash and wound  Neurological: Negative for dizziness, syncope, speech difficulty, weakness, light-headedness, numbness and headaches  Hematological: Negative for adenopathy  Does not bruise/bleed easily  Psychiatric/Behavioral: Negative for agitation, behavioral problems, confusion and sleep disturbance  Past Medical History:   Diagnosis Date    Age-related osteoporosis without current pathological fracture 8/7/2018    [facility] [address] [address]     Hypertension     Hyperthyroidism        Past Surgical History:   Procedure Laterality Date    BREAST BIOPSY      percutaneous needle core    BREAST LUMPECTOMY  1998    [facility]    TOTAL HIP ARTHROPLASTY Left     TUMOR REMOVAL  1975    gynecologic services to remove a tumor       Social History     Social History    Marital status:       Spouse name: N/A    Number of children: N/A    Years of education: N/A     Social History Main Topics    Smoking status: Never Smoker    Smokeless tobacco: Never Used    Alcohol use No    Drug use: No    Sexual activity: Not on file     Other Topics Concern    Not on file     Social History Narrative    Born in [facility]           Family History   Problem Relation Age of Onset   Yoni Hero Other Father         old age       No Known Allergies      Current Outpatient Prescriptions:     ascorbic acid (VITAMIN C) 250 MG tablet, Take 1 tablet (250 mg total) by mouth daily, Disp: , Rfl: 0    Calcium Carbonate-Vit D-Min (CALTRATE 600+D PLUS MINERALS) 600-800 MG-UNIT CHEW, Chew, Disp: , Rfl:     ferrous sulfate 324 (65 Fe) mg, Take 1 tablet (324 mg total) by mouth 2 (two) times a day before meals, Disp: , Rfl: 0    methimazole (TAPAZOLE) 10 mg tablet, TAKE 1 TABLET BY MOUTH IN THE MORNING, Disp: 30 tablet, Rfl: 0    metoprolol succinate (TOPROL-XL) 25 mg 24 hr tablet, Take 1 tablet (25 mg total) by mouth every morning, Disp: 30 tablet, Rfl: 5    nystatin (MYCOSTATIN) ointment, Apply topically 2 (two) times a day, Disp: 30 g, Rfl: 0    ranitidine (ZANTAC) 150 MG capsule, Take 1 capsule (150 mg total) by mouth 2 (two) times a day, Disp: 180 capsule, Rfl: 3    tamoxifen (NOLVADEX) 20 mg tablet, TAKE 1 TABLET DAILY, Disp: 90 tablet, Rfl: 4    traZODone (DESYREL) 50 mg tablet, TAKE 1 TABLET BY MOUTH DAILY AT BEDTIME, Disp: 30 tablet, Rfl: 0  No current facility-administered medications for this visit  Physical Exam:  /80 (BP Location: Right arm, Patient Position: Sitting, Cuff Size: Adult)   Pulse 62   Temp 98 7 °F (37 1 °C) (Tympanic)   Resp 18   Ht 5' (1 524 m)   Wt 54 kg (119 lb)   SpO2 99%   BMI 23 24 kg/m²     Physical Exam   Constitutional: She is oriented to person, place, and time  She appears well-developed and well-nourished  No distress  HENT:   Head: Normocephalic and atraumatic  Nose: Nose normal    Mouth/Throat: Oropharynx is clear and moist    Poor dentition   Eyes: Conjunctivae and EOM are normal  Pupils are equal, round, and reactive to light  Right eye exhibits no discharge  Left eye exhibits no discharge  No scleral icterus  Neck: Normal range of motion  Neck supple  No JVD present  No tracheal deviation present  No thyromegaly present  Cardiovascular: Normal rate, regular rhythm and normal heart sounds  Exam reveals no friction rub  No murmur heard  Left breast examination revealed a 1 cm mass in the 5 o'clock position  Pulmonary/Chest: Effort normal and breath sounds normal  No stridor  No respiratory distress  She has no wheezes  She has no rales  She exhibits no tenderness  Abdominal: Soft  Bowel sounds are normal  She exhibits no distension and no mass   There is no hepatosplenomegaly, splenomegaly or hepatomegaly  There is no tenderness  There is no rebound and no guarding  Musculoskeletal: Normal range of motion  She exhibits no edema, tenderness or deformity  Lymphadenopathy:     She has no cervical adenopathy  Neurological: She is alert and oriented to person, place, and time  She has normal reflexes  No cranial nerve deficit  Coordination normal    Skin: Skin is warm and dry  No rash noted  She is not diaphoretic  No erythema  No pallor  Psychiatric: She has a normal mood and affect  Her behavior is normal  Judgment and thought content normal          Labs:  Lab Results   Component Value Date    WBC 7 49 08/22/2018    HGB 10 7 (L) 08/22/2018    HCT 35 2 08/22/2018    MCV 95 08/22/2018     08/22/2018     Lab Results   Component Value Date     (H) 08/22/2018    K 4 0 08/22/2018     (H) 08/22/2018    CO2 27 08/22/2018    ANIONGAP 8 08/22/2018    BUN 22 08/22/2018    CREATININE 0 82 08/22/2018    GLUCOSE 104 08/22/2018    CALCIUM 8 5 08/22/2018    AST 9 08/22/2018    ALT 10 (L) 08/22/2018    ALKPHOS 35 (L) 08/22/2018    PROT 6 6 08/22/2018    BILITOT 0 57 08/22/2018    EGFR 68 08/22/2018     No results found for: TSH    Patient voiced understanding and agreement in the above discussion  Aware to contact our office with questions/symptoms in the interim

## 2018-08-27 LAB
METHYLMALONATE SERPL-SCNC: 234 NMOL/L (ref 0–378)
SL AMB DISCLAIMER: NORMAL

## 2018-09-21 DIAGNOSIS — F51.04 PSYCHOPHYSIOLOGICAL INSOMNIA: ICD-10-CM

## 2018-09-21 RX ORDER — TRAZODONE HYDROCHLORIDE 50 MG/1
TABLET ORAL
Qty: 30 TABLET | Refills: 0 | Status: SHIPPED | OUTPATIENT
Start: 2018-09-21

## 2019-04-03 ENCOUNTER — APPOINTMENT (OUTPATIENT)
Dept: LAB | Facility: CLINIC | Age: 79
End: 2019-04-03
Payer: MEDICARE

## 2019-04-03 DIAGNOSIS — C50.512 MALIGNANT NEOPLASM OF LOWER-OUTER QUADRANT OF LEFT BREAST OF FEMALE, ESTROGEN RECEPTOR POSITIVE (HCC): Chronic | ICD-10-CM

## 2019-04-03 DIAGNOSIS — Z17.0 MALIGNANT NEOPLASM OF LOWER-OUTER QUADRANT OF LEFT BREAST OF FEMALE, ESTROGEN RECEPTOR POSITIVE (HCC): Chronic | ICD-10-CM

## 2019-04-03 DIAGNOSIS — D64.9 ANEMIA, UNSPECIFIED TYPE: ICD-10-CM

## 2019-04-03 LAB
25(OH)D3 SERPL-MCNC: 35.1 NG/ML (ref 30–100)
ALBUMIN SERPL BCP-MCNC: 3.4 G/DL (ref 3.5–5)
ALP SERPL-CCNC: 36 U/L (ref 46–116)
ALT SERPL W P-5'-P-CCNC: 8 U/L (ref 12–78)
ANION GAP SERPL CALCULATED.3IONS-SCNC: 1 MMOL/L (ref 4–13)
AST SERPL W P-5'-P-CCNC: 10 U/L (ref 5–45)
BASOPHILS # BLD AUTO: 0.04 THOUSANDS/ΜL (ref 0–0.1)
BASOPHILS NFR BLD AUTO: 1 % (ref 0–1)
BILIRUB SERPL-MCNC: 0.59 MG/DL (ref 0.2–1)
BUN SERPL-MCNC: 16 MG/DL (ref 5–25)
CALCIUM SERPL-MCNC: 8.6 MG/DL (ref 8.3–10.1)
CHLORIDE SERPL-SCNC: 109 MMOL/L (ref 100–108)
CO2 SERPL-SCNC: 30 MMOL/L (ref 21–32)
CREAT SERPL-MCNC: 0.94 MG/DL (ref 0.6–1.3)
EOSINOPHIL # BLD AUTO: 0.14 THOUSAND/ΜL (ref 0–0.61)
EOSINOPHIL NFR BLD AUTO: 2 % (ref 0–6)
ERYTHROCYTE [DISTWIDTH] IN BLOOD BY AUTOMATED COUNT: 13.6 % (ref 11.6–15.1)
GFR SERPL CREATININE-BSD FRML MDRD: 58 ML/MIN/1.73SQ M
GLUCOSE P FAST SERPL-MCNC: 92 MG/DL (ref 65–99)
HCT VFR BLD AUTO: 35.8 % (ref 34.8–46.1)
HGB BLD-MCNC: 10.9 G/DL (ref 11.5–15.4)
IMM GRANULOCYTES # BLD AUTO: 0.02 THOUSAND/UL (ref 0–0.2)
IMM GRANULOCYTES NFR BLD AUTO: 0 % (ref 0–2)
LYMPHOCYTES # BLD AUTO: 1.15 THOUSANDS/ΜL (ref 0.6–4.47)
LYMPHOCYTES NFR BLD AUTO: 16 % (ref 14–44)
MCH RBC QN AUTO: 28.3 PG (ref 26.8–34.3)
MCHC RBC AUTO-ENTMCNC: 30.4 G/DL (ref 31.4–37.4)
MCV RBC AUTO: 93 FL (ref 82–98)
MONOCYTES # BLD AUTO: 0.72 THOUSAND/ΜL (ref 0.17–1.22)
MONOCYTES NFR BLD AUTO: 10 % (ref 4–12)
NEUTROPHILS # BLD AUTO: 5.12 THOUSANDS/ΜL (ref 1.85–7.62)
NEUTS SEG NFR BLD AUTO: 71 % (ref 43–75)
NRBC BLD AUTO-RTO: 0 /100 WBCS
PLATELET # BLD AUTO: 184 THOUSANDS/UL (ref 149–390)
PMV BLD AUTO: 11.7 FL (ref 8.9–12.7)
POTASSIUM SERPL-SCNC: 4.4 MMOL/L (ref 3.5–5.3)
PROT SERPL-MCNC: 6.4 G/DL (ref 6.4–8.2)
RBC # BLD AUTO: 3.85 MILLION/UL (ref 3.81–5.12)
SODIUM SERPL-SCNC: 140 MMOL/L (ref 136–145)
WBC # BLD AUTO: 7.19 THOUSAND/UL (ref 4.31–10.16)

## 2019-04-03 PROCEDURE — 82306 VITAMIN D 25 HYDROXY: CPT | Performed by: INTERNAL MEDICINE

## 2019-04-03 PROCEDURE — 80053 COMPREHEN METABOLIC PANEL: CPT

## 2019-04-03 PROCEDURE — 85025 COMPLETE CBC W/AUTO DIFF WBC: CPT

## 2019-04-03 PROCEDURE — 36415 COLL VENOUS BLD VENIPUNCTURE: CPT | Performed by: INTERNAL MEDICINE

## 2019-04-08 ENCOUNTER — HOSPITAL ENCOUNTER (OUTPATIENT)
Dept: INFUSION CENTER | Facility: HOSPITAL | Age: 79
Discharge: HOME | End: 2019-04-08
Payer: MEDICARE

## 2019-04-08 ENCOUNTER — OFFICE VISIT (OUTPATIENT)
Dept: HEMATOLOGY ONCOLOGY | Facility: CLINIC | Age: 79
End: 2019-04-08
Payer: MEDICARE

## 2019-04-08 VITALS
TEMPERATURE: 98.4 F | HEIGHT: 60 IN | SYSTOLIC BLOOD PRESSURE: 124 MMHG | OXYGEN SATURATION: 98 % | RESPIRATION RATE: 16 BRPM | DIASTOLIC BLOOD PRESSURE: 70 MMHG | BODY MASS INDEX: 24.42 KG/M2 | HEART RATE: 83 BPM | WEIGHT: 124.4 LBS

## 2019-04-08 DIAGNOSIS — Z17.0 MALIGNANT NEOPLASM OF LOWER-OUTER QUADRANT OF LEFT BREAST OF FEMALE, ESTROGEN RECEPTOR POSITIVE (HCC): Primary | Chronic | ICD-10-CM

## 2019-04-08 DIAGNOSIS — C50.512 MALIGNANT NEOPLASM OF LOWER-OUTER QUADRANT OF LEFT BREAST OF FEMALE, ESTROGEN RECEPTOR POSITIVE (HCC): Primary | Chronic | ICD-10-CM

## 2019-04-08 PROCEDURE — 99214 OFFICE O/P EST MOD 30 MIN: CPT | Performed by: INTERNAL MEDICINE

## 2019-04-08 PROCEDURE — 96401 CHEMO ANTI-NEOPL SQ/IM: CPT

## 2019-04-08 RX ORDER — TAMOXIFEN CITRATE 20 MG/1
20 TABLET ORAL DAILY
Qty: 90 TABLET | Refills: 4 | Status: SHIPPED | OUTPATIENT
Start: 2019-04-08 | End: 2019-09-26 | Stop reason: SDUPTHER

## 2019-04-08 RX ADMIN — DENOSUMAB 60 MG: 60 INJECTION SUBCUTANEOUS at 15:28

## 2019-04-08 NOTE — PROGRESS NOTES
Pt tolerated prolia injection without adverse reaction  Discharged ambulatory with family  Defers aVS  Next appt set up with son

## 2019-04-08 NOTE — PLAN OF CARE
Problem: Potential for Falls  Goal: Patient will remain free of falls  Description  INTERVENTIONS:  - Assess patient frequently for physical needs  -  Identify cognitive and physical deficits and behaviors that affect risk of falls    -  [place] fall precautions as indicated by assessment   - Educate patient/family on patient safety including physical limitations  - Instruct patient to call for assistance with activity based on assessment  - Modify environment to reduce risk of injury  - Consider OT/PT consult to assist with strengthening/mobility  Outcome: Progressing

## 2019-04-11 ENCOUNTER — OFFICE VISIT (OUTPATIENT)
Dept: FAMILY MEDICINE CLINIC | Facility: CLINIC | Age: 79
End: 2019-04-11
Payer: MEDICARE

## 2019-04-11 VITALS
HEIGHT: 60 IN | WEIGHT: 125 LBS | BODY MASS INDEX: 24.54 KG/M2 | TEMPERATURE: 99.1 F | SYSTOLIC BLOOD PRESSURE: 102 MMHG | HEART RATE: 80 BPM | DIASTOLIC BLOOD PRESSURE: 68 MMHG

## 2019-04-11 DIAGNOSIS — I49.9 IRREGULAR HEART RHYTHM: ICD-10-CM

## 2019-04-11 DIAGNOSIS — C50.512 MALIGNANT NEOPLASM OF LOWER-OUTER QUADRANT OF LEFT BREAST OF FEMALE, ESTROGEN RECEPTOR POSITIVE (HCC): Chronic | ICD-10-CM

## 2019-04-11 DIAGNOSIS — Z01.818 PRE-OP EXAMINATION: Primary | ICD-10-CM

## 2019-04-11 DIAGNOSIS — M81.0 AGE-RELATED OSTEOPOROSIS WITHOUT CURRENT PATHOLOGICAL FRACTURE: Chronic | ICD-10-CM

## 2019-04-11 DIAGNOSIS — Z17.0 MALIGNANT NEOPLASM OF LOWER-OUTER QUADRANT OF LEFT BREAST OF FEMALE, ESTROGEN RECEPTOR POSITIVE (HCC): Chronic | ICD-10-CM

## 2019-04-11 DIAGNOSIS — I10 BENIGN ESSENTIAL HYPERTENSION: ICD-10-CM

## 2019-04-11 DIAGNOSIS — K02.9 DENTAL CARIES: ICD-10-CM

## 2019-04-11 PROCEDURE — 99214 OFFICE O/P EST MOD 30 MIN: CPT | Performed by: FAMILY MEDICINE

## 2019-04-16 PROCEDURE — 88361 TUMOR IMMUNOHISTOCHEM/COMPUT: CPT | Performed by: PATHOLOGY

## 2019-04-16 PROCEDURE — 88307 TISSUE EXAM BY PATHOLOGIST: CPT | Performed by: PATHOLOGY

## 2019-04-16 PROCEDURE — 88367 INSITU HYBRIDIZATION AUTO: CPT | Performed by: PATHOLOGY

## 2019-04-17 ENCOUNTER — LAB REQUISITION (OUTPATIENT)
Dept: LAB | Facility: HOSPITAL | Age: 79
End: 2019-04-17
Payer: MEDICARE

## 2019-04-17 DIAGNOSIS — C50.912 MALIGNANT NEOPLASM OF LEFT FEMALE BREAST (HCC): ICD-10-CM

## 2019-09-23 ENCOUNTER — HOSPITAL ENCOUNTER (OUTPATIENT)
Dept: INFUSION CENTER | Facility: HOSPITAL | Age: 79
Discharge: HOME | End: 2019-09-23

## 2019-09-23 ENCOUNTER — APPOINTMENT (OUTPATIENT)
Dept: LAB | Facility: CLINIC | Age: 79
End: 2019-09-23
Payer: MEDICARE

## 2019-09-23 DIAGNOSIS — E55.9 VITAMIN D DEFICIENCY: ICD-10-CM

## 2019-09-23 DIAGNOSIS — C50.512 MALIGNANT NEOPLASM OF LOWER-OUTER QUADRANT OF LEFT BREAST OF FEMALE, ESTROGEN RECEPTOR POSITIVE (HCC): Primary | ICD-10-CM

## 2019-09-23 DIAGNOSIS — Z17.0 MALIGNANT NEOPLASM OF LOWER-OUTER QUADRANT OF LEFT BREAST OF FEMALE, ESTROGEN RECEPTOR POSITIVE (HCC): ICD-10-CM

## 2019-09-23 DIAGNOSIS — Z17.0 MALIGNANT NEOPLASM OF LOWER-OUTER QUADRANT OF LEFT BREAST OF FEMALE, ESTROGEN RECEPTOR POSITIVE (HCC): Primary | ICD-10-CM

## 2019-09-23 DIAGNOSIS — C50.512 MALIGNANT NEOPLASM OF LOWER-OUTER QUADRANT OF LEFT BREAST OF FEMALE, ESTROGEN RECEPTOR POSITIVE (HCC): ICD-10-CM

## 2019-09-23 LAB
25(OH)D3 SERPL-MCNC: 31.4 NG/ML (ref 30–100)
ALBUMIN SERPL BCP-MCNC: 3.4 G/DL (ref 3.5–5)
ALP SERPL-CCNC: 37 U/L (ref 46–116)
ALT SERPL W P-5'-P-CCNC: 11 U/L (ref 12–78)
ANION GAP SERPL CALCULATED.3IONS-SCNC: 2 MMOL/L (ref 4–13)
AST SERPL W P-5'-P-CCNC: 8 U/L (ref 5–45)
BASOPHILS # BLD AUTO: 0.05 THOUSANDS/ΜL (ref 0–0.1)
BASOPHILS NFR BLD AUTO: 1 % (ref 0–1)
BILIRUB SERPL-MCNC: 0.43 MG/DL (ref 0.2–1)
BUN SERPL-MCNC: 22 MG/DL (ref 5–25)
CALCIUM SERPL-MCNC: 8.5 MG/DL (ref 8.3–10.1)
CHLORIDE SERPL-SCNC: 108 MMOL/L (ref 100–108)
CO2 SERPL-SCNC: 30 MMOL/L (ref 21–32)
CREAT SERPL-MCNC: 1.03 MG/DL (ref 0.6–1.3)
EOSINOPHIL # BLD AUTO: 0.66 THOUSAND/ΜL (ref 0–0.61)
EOSINOPHIL NFR BLD AUTO: 8 % (ref 0–6)
ERYTHROCYTE [DISTWIDTH] IN BLOOD BY AUTOMATED COUNT: 14 % (ref 11.6–15.1)
GFR SERPL CREATININE-BSD FRML MDRD: 51 ML/MIN/1.73SQ M
GLUCOSE SERPL-MCNC: 89 MG/DL (ref 65–140)
HCT VFR BLD AUTO: 36.7 % (ref 34.8–46.1)
HGB BLD-MCNC: 11.4 G/DL (ref 11.5–15.4)
IMM GRANULOCYTES # BLD AUTO: 0.05 THOUSAND/UL (ref 0–0.2)
IMM GRANULOCYTES NFR BLD AUTO: 1 % (ref 0–2)
LYMPHOCYTES # BLD AUTO: 1.71 THOUSANDS/ΜL (ref 0.6–4.47)
LYMPHOCYTES NFR BLD AUTO: 20 % (ref 14–44)
MCH RBC QN AUTO: 29.2 PG (ref 26.8–34.3)
MCHC RBC AUTO-ENTMCNC: 31.1 G/DL (ref 31.4–37.4)
MCV RBC AUTO: 94 FL (ref 82–98)
MONOCYTES # BLD AUTO: 0.89 THOUSAND/ΜL (ref 0.17–1.22)
MONOCYTES NFR BLD AUTO: 11 % (ref 4–12)
NEUTROPHILS # BLD AUTO: 5.09 THOUSANDS/ΜL (ref 1.85–7.62)
NEUTS SEG NFR BLD AUTO: 59 % (ref 43–75)
NRBC BLD AUTO-RTO: 0 /100 WBCS
PLATELET # BLD AUTO: 166 THOUSANDS/UL (ref 149–390)
PMV BLD AUTO: 12.9 FL (ref 8.9–12.7)
POTASSIUM SERPL-SCNC: 3.9 MMOL/L (ref 3.5–5.3)
PROT SERPL-MCNC: 6.8 G/DL (ref 6.4–8.2)
RBC # BLD AUTO: 3.91 MILLION/UL (ref 3.81–5.12)
SODIUM SERPL-SCNC: 140 MMOL/L (ref 136–145)
WBC # BLD AUTO: 8.45 THOUSAND/UL (ref 4.31–10.16)

## 2019-09-23 PROCEDURE — 82306 VITAMIN D 25 HYDROXY: CPT

## 2019-09-23 PROCEDURE — 36415 COLL VENOUS BLD VENIPUNCTURE: CPT

## 2019-09-23 PROCEDURE — 85025 COMPLETE CBC W/AUTO DIFF WBC: CPT

## 2019-09-23 PROCEDURE — 80053 COMPREHEN METABOLIC PANEL: CPT

## 2019-09-26 ENCOUNTER — OFFICE VISIT (OUTPATIENT)
Dept: HEMATOLOGY ONCOLOGY | Facility: CLINIC | Age: 79
End: 2019-09-26
Payer: MEDICARE

## 2019-09-26 ENCOUNTER — HOSPITAL ENCOUNTER (OUTPATIENT)
Dept: INFUSION CENTER | Facility: HOSPITAL | Age: 79
Discharge: HOME | End: 2019-09-26
Payer: MEDICARE

## 2019-09-26 VITALS
RESPIRATION RATE: 16 BRPM | DIASTOLIC BLOOD PRESSURE: 72 MMHG | TEMPERATURE: 97.5 F | HEART RATE: 93 BPM | OXYGEN SATURATION: 99 % | HEIGHT: 60 IN | SYSTOLIC BLOOD PRESSURE: 98 MMHG | WEIGHT: 122.4 LBS | BODY MASS INDEX: 24.03 KG/M2

## 2019-09-26 DIAGNOSIS — C50.512 MALIGNANT NEOPLASM OF LOWER-OUTER QUADRANT OF LEFT BREAST OF FEMALE, ESTROGEN RECEPTOR POSITIVE (HCC): Primary | ICD-10-CM

## 2019-09-26 DIAGNOSIS — Z17.0 MALIGNANT NEOPLASM OF LOWER-OUTER QUADRANT OF LEFT BREAST OF FEMALE, ESTROGEN RECEPTOR POSITIVE (HCC): ICD-10-CM

## 2019-09-26 DIAGNOSIS — M81.0 AGE-RELATED OSTEOPOROSIS WITHOUT CURRENT PATHOLOGICAL FRACTURE: Chronic | ICD-10-CM

## 2019-09-26 DIAGNOSIS — M81.0 AGE-RELATED OSTEOPOROSIS WITHOUT CURRENT PATHOLOGICAL FRACTURE: Primary | ICD-10-CM

## 2019-09-26 DIAGNOSIS — Z17.0 MALIGNANT NEOPLASM OF LOWER-OUTER QUADRANT OF LEFT BREAST OF FEMALE, ESTROGEN RECEPTOR POSITIVE (HCC): Primary | ICD-10-CM

## 2019-09-26 DIAGNOSIS — C50.512 MALIGNANT NEOPLASM OF LOWER-OUTER QUADRANT OF LEFT BREAST OF FEMALE, ESTROGEN RECEPTOR POSITIVE (HCC): ICD-10-CM

## 2019-09-26 PROCEDURE — 96401 CHEMO ANTI-NEOPL SQ/IM: CPT

## 2019-09-26 PROCEDURE — 99213 OFFICE O/P EST LOW 20 MIN: CPT | Performed by: NURSE PRACTITIONER

## 2019-09-26 RX ORDER — TAMOXIFEN CITRATE 20 MG/1
20 TABLET ORAL DAILY
Qty: 90 TABLET | Refills: 4 | Status: SHIPPED | OUTPATIENT
Start: 2019-09-26 | End: 2020-05-19 | Stop reason: SDUPTHER

## 2019-09-26 RX ADMIN — DENOSUMAB 60 MG: 60 INJECTION SUBCUTANEOUS at 15:23

## 2019-09-26 NOTE — PROGRESS NOTES
Hematology/Oncology Outpatient Follow-up  Arpita Waterman [de-identified] y o  female 1940 [id number]    Date:  9/26/2019      Assessment and Plan:  1  Malignant neoplasm of lower-outer quadrant of left breast of female, estrogen receptor positive [facility])  Pathology from patient's recent left partial mastectomy was reviewed with Dr Axel Chavez prior to today's visit  The plan is to continue tamoxifen without any changes since patient is tolerating it well  The patient and her son agree with the plan  She will follow up again in about 6 months with repeat laboratory studies prior  She will also continue to follow up with her breast surgeon on a regular basis and get her breast imaging as per her discretion  The patient's son mentions that she has a follow-up with her next week  - CBC and differential; Future  - Comprehensive metabolic panel; Future  - Vitamin D 25 hydroxy; Future  - tamoxifen (NOLVADEX) 20 mg tablet; Take 1 tablet (20 mg total) by mouth daily  Dispense: 90 tablet; Refill: 4    2  Age-related osteoporosis without current pathological fracture  Patient will continue her calcium and vitamin-D supplements as she is taking  She will also continue to get Prolia injections every 6 months for her osteoporosis and is due for 1 today after the visit  Her repeat DEXA scan will also be due prior to her next follow-up in February 2020 which we will order today  - DXA bone density spine hip and pelvis; Future    HPI:  Patient presents today for a follow-up appointment accompanied by her son  She had a lumpectomy of her left breast mass done on 04/16/2019 by her breast surgeon after it was found to be increasing in size  The tumor measured 2 x 1 9 x 1 5 cm, ER 90-95% positive LA 65-70% positive, Her 2 Марина negative by FISH  Patient continues to take her tamoxifen along with her calcium/vitamin-D supplement on a regular basis which she is tolerating well    She has no complaints and reports good energy and good appetite  Most recent laboratory studies from 09/23/2019 showed normal white cells and platelets, mild normocytic anemia H&H 11 4/36 7 MCV 94 appears to be chronic and somewhat improved for patient  She is taking a daily iron supplement  Her CMP is without any significant findings, vitamin-D 31 4  Oncology History    Patient has a remote history of left breast cancer diagnosed 1998 status post lumpectomy, adjuvant radiation, and 5 years worth of endocrine therapy with tamoxifen  Her family history seems to be negative for breast cancer  She was found to have a lump on the left breast and mammogram was done on August 2, 2017, which showed a 1 2 cm lesion at 5 o'clock about 3 cm from the nipple  Ultrasound was done which confirmed the abnormal findings  Ultrasound-guided core biopsy was done October 6, 2017, the pathology revealed high-grade 3 infiltrating ductal carcinoma  The tumor size of the core biopsy measured 1 0 cm with prominent in situ component without lymphovascular invasion  The ER was 98% positive, CO 20% positive, her 2 Марина negative by FISH  The patient was evaluated for surgical approach of her left breast cancer and was found not to be a candidate for surgical intervention due to her comorbidities  She was started on tamoxifen in November 2017 which she is tolerating pretty well  Patient  DEXA scan 02/16/2018 which showed osteoporosis of the lumbar spine and the left forearm  The hips are not assessed due to bilateral hip replacements  She was started on Prolia 60 mg subcu every 6 months on 02/22/2018          Malignant neoplasm of lower-outer quadrant of left breast of female, estrogen receptor positive ([facility] [address] )    10/6/2017 Initial Diagnosis     Malignant neoplasm of lower-outer quadrant of left breast of female, estrogen receptor positive ([address] )      11/2017 -  Hormone Therapy     Started tamoxifen  (was also on tamoxifen in the past 0199-0853)      4/16/2019 Surgery     Left partial mastectomy without lymph node evaluation  Surgeon:  Dr Shivam Matthews    Pathology:  2 x 1 9 x 1 5 cm- ER positive 90-95%, MT positive 65-70%, her 2-by FISH (stage IA)         Interval history:    ROS: Review of Systems   Constitutional: Negative for activity change, appetite change, chills, fatigue, fever and unexpected weight change  HENT: Negative for congestion, mouth sores, nosebleeds, sore throat and trouble swallowing  Eyes: Negative  Respiratory: Negative for cough, chest tightness and shortness of breath  Cardiovascular: Negative for chest pain, palpitations and leg swelling  Gastrointestinal: Negative for abdominal distention, abdominal pain, blood in stool, constipation, diarrhea, nausea and vomiting  Genitourinary: Negative for difficulty urinating, dysuria, frequency, hematuria and urgency  Musculoskeletal: Negative for arthralgias, back pain, gait problem, joint swelling and myalgias  Skin: Negative for color change, pallor and rash  Neurological: Negative for dizziness, weakness, light-headedness, numbness and headaches  Hematological: Negative for adenopathy  Does not bruise/bleed easily  Psychiatric/Behavioral: Negative for dysphoric mood and sleep disturbance  The patient is not nervous/anxious  Past Medical History:   Diagnosis Date    Age-related osteoporosis without current pathological fracture 8/7/2018    Breast cancer ([facility] [address] )     Hypertension     Hyperthyroidism        Past Surgical History:   Procedure Laterality Date    BREAST BIOPSY      percutaneous needle core    [facility]    TOTAL HIP ARTHROPLASTY Left     TUMOR REMOVAL  1975    gynecologic services to remove a tumor       Social History     Socioeconomic History    Marital status:       Spouse name: Not on file    Number of children: Not on file    Years of education: Not on file    Highest education level: Not on file   Occupational History    Not on file   Social Needs    Financial resource strain: Not on file    Food insecurity:     Worry: Not on file     Inability: Not on file    Transportation needs:     Medical: Not on file     Non-medical: Not on file   Tobacco Use    Smoking status: Never Smoker    Smokeless tobacco: Never Used   Substance and Sexual Activity    Alcohol use: No    Drug use: No    Sexual activity: Not on file   Lifestyle    Physical activity:     Days per week: Not on file     Minutes per session: Not on file    Stress: Not on file   Relationships    Social connections:     Talks on phone: Not on file     Gets together: Not on file     Attends [employer] service: Not on file     Active member of club or organization: Not on file     Attends meetings of clubs or organizations: Not on file     Relationship status: Not on file    Intimate partner violence:     Fear of current or ex partner: Not on file     Emotionally abused: Not on file     Physically abused: Not on file     Forced sexual activity: Not on file   Other Topics Concern    Not on file   Social History Narrative    Born in [facility]       Family History   Problem Relation Age of Onset    Other Father         old age       No Known Allergies      Current Outpatient Medications:     ascorbic acid (VITAMIN C) 250 MG tablet, Take 1 tablet (250 mg total) by mouth daily, Disp: , Rfl: 0    Calcium Carbonate-Vit D-Min (CALTRATE 600+D PLUS MINERALS) 600-800 MG-UNIT CHEW, Chew, Disp: , Rfl:     ferrous sulfate 324 (65 Fe) mg, Take 1 tablet (324 mg total) by mouth 2 (two) times a day before meals, Disp: , Rfl: 0    methimazole (TAPAZOLE) 10 mg tablet, TAKE 1 TABLET BY MOUTH IN THE MORNING, Disp: 30 tablet, Rfl: 0    metoprolol succinate (TOPROL-XL) 25 mg 24 hr tablet, Take 1 tablet (25 mg total) by mouth every morning, Disp: 30 tablet, Rfl: 5    nystatin (MYCOSTATIN) ointment, Apply topically 2 (two) times a day, Disp: 30 g, Rfl: 0    ranitidine (ZANTAC) 150 MG capsule, Take 1 capsule (150 mg total) by mouth 2 (two) times a day, Disp: 180 capsule, Rfl: 3    tamoxifen (NOLVADEX) 20 mg tablet, Take 1 tablet (20 mg total) by mouth daily, Disp: 90 tablet, Rfl: 4    traZODone (DESYREL) 50 mg tablet, TAKE 1 TABLET BY MOUTH DAILY AT BEDTIME, Disp: 30 tablet, Rfl: 0      Physical Exam:  BP 98/72 (BP Location: Right arm)   Pulse 93   Temp 97 5 °F (36 4 °C) (Tympanic Core)   Resp 16   Ht 5' (1 524 m)   Wt 55 5 kg (122 lb 6 4 oz)   SpO2 99%   BMI 23 90 kg/m²     Physical Exam   Constitutional: She is oriented to person, place, and time  She appears well-developed and well-nourished  No distress  HENT:   Head: Normocephalic and atraumatic  Mouth/Throat: Oropharynx is clear and moist  Abnormal dentition  No oropharyngeal exudate  Eyes: Pupils are equal, round, and reactive to light  Conjunctivae are normal  No scleral icterus  Neck: Normal range of motion  Neck supple  No thyromegaly present  Cardiovascular: Normal rate, normal heart sounds and intact distal pulses  An irregularly irregular rhythm present  No murmur heard  Pulmonary/Chest: Effort normal and breath sounds normal  No respiratory distress  Left breast exhibits no mass and no tenderness  Abdominal: Soft  Bowel sounds are normal  She exhibits no distension  There is no hepatosplenomegaly  There is no tenderness  Musculoskeletal: Normal range of motion  She exhibits no edema  Lymphadenopathy:     She has no cervical adenopathy  She has no axillary adenopathy  Neurological: She is alert and oriented to person, place, and time  Skin: Skin is warm and dry  No rash noted  She is not diaphoretic  No erythema  There is pallor  Psychiatric: She has a normal mood and affect  Her behavior is normal  Judgment and thought content normal    Vitals reviewed          Labs:  Lab Results   Component Value Date    WBC 8 45 09/23/2019    HGB 11 4 (L) 09/23/2019    HCT 36 7 09/23/2019    MCV 94 09/23/2019     09/23/2019     Lab Results   Component Value Date    K 3 9 09/23/2019     09/23/2019    CO2 30 09/23/2019    BUN 22 09/23/2019    CREATININE 1 03 09/23/2019    GLUF 92 04/03/2019    CALCIUM 8 5 09/23/2019    AST 8 09/23/2019    ALT 11 (L) 09/23/2019    ALKPHOS 37 (L) 09/23/2019    EGFR 51 09/23/2019         Patient voiced understanding and agreement in the above discussion  Aware to contact our office with questions/symptoms in the interim

## 2019-09-26 NOTE — LETTER
September 26, 2019     Ally Cabral MD  [address]  [place] [address]    Patient: Cece Becerra   YOB: 1940   Date of Visit: 9/26/2019       Dear Dr Yocasta Reyez: Thank you for referring Cece Becerra to me for evaluation  Below are my notes for this consultation  If you have questions, please do not hesitate to call me  I look forward to following your patient along with you  Sincerely,        PAUL Anne        CC: No Recipients  Domingo Anne [address]  9/26/2019  3:29 PM  Sign at close encounter  Hematology/Oncology Outpatient Follow-up  Cece Becerra [de-identified] y o  female 1940 [id number]    Date:  9/26/2019      Assessment and Plan:  1  Malignant neoplasm of lower-outer quadrant of left breast of female, estrogen receptor positive [facility])  Pathology from patient's recent left partial mastectomy was reviewed with Dr Kulwinder Soto prior to today's visit  The plan is to continue tamoxifen without any changes since patient is tolerating it well  The patient and her son agree with the plan  She will follow up again in about 6 months with repeat laboratory studies prior  She will also continue to follow up with her breast surgeon on a regular basis and get her breast imaging as per her discretion  The patient's son mentions that she has a follow-up with her next week  - CBC and differential; Future  - Comprehensive metabolic panel; Future  - Vitamin D 25 hydroxy; Future  - tamoxifen (NOLVADEX) 20 mg tablet; Take 1 tablet (20 mg total) by mouth daily  Dispense: 90 tablet; Refill: 4    2  Age-related osteoporosis without current pathological fracture  Patient will continue her calcium and vitamin-D supplements as she is taking  She will also continue to get Prolia injections every 6 months for her osteoporosis and is due for 1 today after the visit  Her repeat DEXA scan will also be due prior to her next follow-up in February 2020 which we will order today      - DXA bone density spine hip and pelvis; Future    HPI:  Patient presents today for a follow-up appointment accompanied by her son  She had a lumpectomy of her left breast mass done on 04/16/2019 by her breast surgeon after it was found to be increasing in size  The tumor measured 2 x 1 9 x 1 5 cm, ER 90-95% positive NE 65-70% positive, Her 2 Марина negative by FISH  Patient continues to take her tamoxifen along with her calcium/vitamin-D supplement on a regular basis which she is tolerating well  She has no complaints and reports good energy and good appetite  Most recent laboratory studies from 09/23/2019 showed normal white cells and platelets, mild normocytic anemia H&H 11 4/36 7 MCV 94 appears to be chronic and somewhat improved for patient  She is taking a daily iron supplement  Her CMP is without any significant findings, vitamin-D 31 4  Oncology History    Patient has a remote history of left breast cancer diagnosed 1998 status post lumpectomy, adjuvant radiation, and 5 years worth of endocrine therapy with tamoxifen  Her family history seems to be negative for breast cancer  She was found to have a lump on the left breast and mammogram was done on August 2, 2017, which showed a 1 2 cm lesion at 5 o'clock about 3 cm from the nipple  Ultrasound was done which confirmed the abnormal findings  Ultrasound-guided core biopsy was done October 6, 2017, the pathology revealed high-grade 3 infiltrating ductal carcinoma  The tumor size of the core biopsy measured 1 0 cm with prominent in situ component without lymphovascular invasion  The ER was 98% positive, NE 20% positive, her 2 Марина negative by FISH  The patient was evaluated for surgical approach of her left breast cancer and was found not to be a candidate for surgical intervention due to her comorbidities  She was started on tamoxifen in November 2017 which she is tolerating pretty well      Patient  DEXA scan 02/16/2018 which showed osteoporosis of the lumbar spine and the left forearm  The hips are not assessed due to bilateral hip replacements  She was started on Prolia 60 mg subcu every 6 months on 02/22/2018  Malignant neoplasm of lower-outer quadrant of left breast of female, estrogen receptor positive ([facility] [address] )    10/6/2017 Initial Diagnosis     Malignant neoplasm of lower-outer quadrant of left breast of female, estrogen receptor positive ([facility] [address] )      11/2017 -  Hormone Therapy     Started tamoxifen  (was also on tamoxifen in the past 5712-1709)      4/16/2019 Surgery     Left partial mastectomy without lymph node evaluation  Surgeon:  Dr Triston Butt    Pathology:  2 x 1 9 x 1 5 cm- ER positive 90-95%, IN positive 65-70%, her 2-by FISH (stage IA)         Interval history:    ROS: Review of Systems   Constitutional: Negative for activity change, appetite change, chills, fatigue, fever and unexpected weight change  HENT: Negative for congestion, mouth sores, nosebleeds, sore throat and trouble swallowing  Eyes: Negative  Respiratory: Negative for cough, chest tightness and shortness of breath  Cardiovascular: Negative for chest pain, palpitations and leg swelling  Gastrointestinal: Negative for abdominal distention, abdominal pain, blood in stool, constipation, diarrhea, nausea and vomiting  Genitourinary: Negative for difficulty urinating, dysuria, frequency, hematuria and urgency  Musculoskeletal: Negative for arthralgias, back pain, gait problem, joint swelling and myalgias  Skin: Negative for color change, pallor and rash  Neurological: Negative for dizziness, weakness, light-headedness, numbness and headaches  Hematological: Negative for adenopathy  Does not bruise/bleed easily  Psychiatric/Behavioral: Negative for dysphoric mood and sleep disturbance  The patient is not nervous/anxious          Past Medical History:   Diagnosis Date    Age-related osteoporosis without current pathological fracture 8/7/2018    Breast cancer ([facility] [address] )     Hypertension     Hyperthyroidism        Past Surgical History:   Procedure Laterality Date    BREAST BIOPSY      percutaneous needle core    [facility]  1998    [facility]    TOTAL HIP ARTHROPLASTY Left     TUMOR REMOVAL  1975    gynecologic services to remove a tumor       Social History     Socioeconomic History    Marital status:       Spouse name: Not on file    Number of children: Not on file    Years of education: Not on file    Highest education level: Not on file   Occupational History    Not on file   Social Needs    Financial resource strain: Not on file    Food insecurity:     Worry: Not on file     Inability: Not on file    Transportation needs:     Medical: Not on file     Non-medical: Not on file   Tobacco Use    Smoking status: Never Smoker    Smokeless tobacco: Never Used   Substance and Sexual Activity    Alcohol use: No    Drug use: No    Sexual activity: Not on file   Lifestyle    Physical activity:     Days per week: Not on file     Minutes per session: Not on file    Stress: Not on file   Relationships    Social connections:     Talks on phone: Not on file     Gets together: Not on file     Attends [employer] service: Not on file     Active member of club or organization: Not on file     Attends meetings of clubs or organizations: Not on file     Relationship status: Not on file    Intimate partner violence:     Fear of current or ex partner: Not on file     Emotionally abused: Not on file     Physically abused: Not on file     Forced sexual activity: Not on file   Other Topics Concern    Not on file   Social History Narrative    Born in [facility]       Family History   Problem Relation Age of Onset    Other Father         old age       No Known Allergies      Current Outpatient Medications:     ascorbic acid (VITAMIN C) 250 MG tablet, Take 1 tablet (250 mg total) by mouth daily, Disp: , Rfl: 0    Calcium Carbonate-Vit D-Min (CALTRATE 600+D PLUS MINERALS) 600-800 MG-UNIT CHEW, Chew, Disp: , Rfl:     ferrous sulfate 324 (65 Fe) mg, Take 1 tablet (324 mg total) by mouth 2 (two) times a day before meals, Disp: , Rfl: 0    methimazole (TAPAZOLE) 10 mg tablet, TAKE 1 TABLET BY MOUTH IN THE MORNING, Disp: 30 tablet, Rfl: 0    metoprolol succinate (TOPROL-XL) 25 mg 24 hr tablet, Take 1 tablet (25 mg total) by mouth every morning, Disp: 30 tablet, Rfl: 5    nystatin (MYCOSTATIN) ointment, Apply topically 2 (two) times a day, Disp: 30 g, Rfl: 0    ranitidine (ZANTAC) 150 MG capsule, Take 1 capsule (150 mg total) by mouth 2 (two) times a day, Disp: 180 capsule, Rfl: 3    tamoxifen (NOLVADEX) 20 mg tablet, Take 1 tablet (20 mg total) by mouth daily, Disp: 90 tablet, Rfl: 4    traZODone (DESYREL) 50 mg tablet, TAKE 1 TABLET BY MOUTH DAILY AT BEDTIME, Disp: 30 tablet, Rfl: 0      Physical Exam:  BP 98/72 (BP Location: Right arm)   Pulse 93   Temp 97 5 °F (36 4 °C) (Tympanic Core)   Resp 16   Ht 5' (1 524 m)   Wt 55 5 kg (122 lb 6 4 oz)   SpO2 99%   BMI 23 90 kg/m²      Physical Exam   Constitutional: She is oriented to person, place, and time  She appears well-developed and well-nourished  No distress  HENT:   Head: Normocephalic and atraumatic  Mouth/Throat: Oropharynx is clear and moist  Abnormal dentition  No oropharyngeal exudate  Eyes: Pupils are equal, round, and reactive to light  Conjunctivae are normal  No scleral icterus  Neck: Normal range of motion  Neck supple  No thyromegaly present  Cardiovascular: Normal rate, normal heart sounds and intact distal pulses  An irregularly irregular rhythm present  No murmur heard  Pulmonary/Chest: Effort normal and breath sounds normal  No respiratory distress  Left breast exhibits no mass and no tenderness  Abdominal: Soft  Bowel sounds are normal  She exhibits no distension  There is no hepatosplenomegaly  There is no tenderness     Musculoskeletal: Normal range of motion  She exhibits no edema  Lymphadenopathy:     She has no cervical adenopathy  She has no axillary adenopathy  Neurological: She is alert and oriented to person, place, and time  Skin: Skin is warm and dry  No rash noted  She is not diaphoretic  No erythema  There is pallor  Psychiatric: She has a normal mood and affect  Her behavior is normal  Judgment and thought content normal    Vitals reviewed  Labs:  Lab Results   Component Value Date    WBC 8 45 09/23/2019    HGB 11 4 (L) 09/23/2019    HCT 36 7 09/23/2019    MCV 94 09/23/2019     09/23/2019     Lab Results   Component Value Date    K 3 9 09/23/2019     09/23/2019    CO2 30 09/23/2019    BUN 22 09/23/2019    CREATININE 1 03 09/23/2019    GLUF 92 04/03/2019    CALCIUM 8 5 09/23/2019    AST 8 09/23/2019    ALT 11 (L) 09/23/2019    ALKPHOS 37 (L) 09/23/2019    EGFR 51 09/23/2019         Patient voiced understanding and agreement in the above discussion  Aware to contact our office with questions/symptoms in the interim

## 2020-03-26 ENCOUNTER — TELEPHONE (OUTPATIENT)
Dept: HEMATOLOGY ONCOLOGY | Facility: CLINIC | Age: 80
End: 2020-03-26

## 2020-03-26 NOTE — TELEPHONE ENCOUNTER
Patient's son, Mirta Brown, called to cancel f/u apt for patient on Fri 3/27 at 3:40 w/Dr Rubia Moser, as she is stuck in [place], and he is unsure of when he'll be able to bring her here  Mirta Brown will call back to reschedule

## 2020-03-26 NOTE — TELEPHONE ENCOUNTER
The patient was called to complete the blood work prior to the visit on 3/27/2020  The patient was screened for the Covid-19 and visitation was mentioned

## 2020-05-19 ENCOUNTER — TELEPHONE (OUTPATIENT)
Dept: HEMATOLOGY ONCOLOGY | Facility: MEDICAL CENTER | Age: 80
End: 2020-05-19

## 2020-05-19 DIAGNOSIS — C50.512 MALIGNANT NEOPLASM OF LOWER-OUTER QUADRANT OF LEFT BREAST OF FEMALE, ESTROGEN RECEPTOR POSITIVE (HCC): ICD-10-CM

## 2020-05-19 DIAGNOSIS — Z17.0 MALIGNANT NEOPLASM OF LOWER-OUTER QUADRANT OF LEFT BREAST OF FEMALE, ESTROGEN RECEPTOR POSITIVE (HCC): ICD-10-CM

## 2020-05-19 RX ORDER — TAMOXIFEN CITRATE 20 MG/1
20 TABLET ORAL DAILY
Qty: 90 TABLET | Refills: 0 | Status: SHIPPED | OUTPATIENT
Start: 2020-05-19 | End: 2020-10-15 | Stop reason: SDUPTHER

## 2020-09-10 ENCOUNTER — TELEPHONE (OUTPATIENT)
Dept: OTHER | Facility: OTHER | Age: 80
End: 2020-09-10

## 2020-09-10 NOTE — TELEPHONE ENCOUNTER
RN from [facility] called in to inform the office that the PT has been picked up by their home care services for skilled PT and OT

## 2020-09-15 ENCOUNTER — TELEPHONE (OUTPATIENT)
Dept: HEMATOLOGY ONCOLOGY | Facility: CLINIC | Age: 80
End: 2020-09-15

## 2020-09-15 NOTE — TELEPHONE ENCOUNTER
Medication Refill     Who is Calling  Patient   Medication  tamoxifen       How many pills left  7   Preferred Pharmacy  [facility]   Call back number  [phone]   Relevant Information

## 2020-10-12 DIAGNOSIS — C50.512 MALIGNANT NEOPLASM OF LOWER-OUTER QUADRANT OF LEFT BREAST OF FEMALE, ESTROGEN RECEPTOR POSITIVE (HCC): ICD-10-CM

## 2020-10-12 DIAGNOSIS — Z17.0 MALIGNANT NEOPLASM OF LOWER-OUTER QUADRANT OF LEFT BREAST OF FEMALE, ESTROGEN RECEPTOR POSITIVE (HCC): ICD-10-CM

## 2020-10-12 RX ORDER — TAMOXIFEN CITRATE 20 MG/1
20 TABLET ORAL DAILY
Qty: 90 TABLET | Refills: 0 | Status: CANCELLED | OUTPATIENT
Start: 2020-10-12

## 2020-10-15 DIAGNOSIS — C50.512 MALIGNANT NEOPLASM OF LOWER-OUTER QUADRANT OF LEFT BREAST OF FEMALE, ESTROGEN RECEPTOR POSITIVE (HCC): ICD-10-CM

## 2020-10-15 DIAGNOSIS — Z17.0 MALIGNANT NEOPLASM OF LOWER-OUTER QUADRANT OF LEFT BREAST OF FEMALE, ESTROGEN RECEPTOR POSITIVE (HCC): ICD-10-CM

## 2020-10-15 RX ORDER — TAMOXIFEN CITRATE 20 MG/1
20 TABLET ORAL DAILY
Qty: 30 TABLET | Refills: 0 | Status: SHIPPED | OUTPATIENT
Start: 2020-10-15 | End: 2020-11-09

## 2020-10-16 ENCOUNTER — TELEPHONE (OUTPATIENT)
Dept: HEMATOLOGY ONCOLOGY | Facility: CLINIC | Age: 80
End: 2020-10-16

## 2020-10-16 DIAGNOSIS — M81.0 AGE-RELATED OSTEOPOROSIS WITHOUT CURRENT PATHOLOGICAL FRACTURE: ICD-10-CM

## 2020-10-16 DIAGNOSIS — C50.512 MALIGNANT NEOPLASM OF LOWER-OUTER QUADRANT OF LEFT BREAST OF FEMALE, ESTROGEN RECEPTOR POSITIVE (HCC): Primary | ICD-10-CM

## 2020-10-16 DIAGNOSIS — Z17.0 MALIGNANT NEOPLASM OF LOWER-OUTER QUADRANT OF LEFT BREAST OF FEMALE, ESTROGEN RECEPTOR POSITIVE (HCC): Primary | ICD-10-CM

## 2020-11-05 ENCOUNTER — OFFICE VISIT (OUTPATIENT)
Dept: FAMILY MEDICINE CLINIC | Facility: CLINIC | Age: 80
End: 2020-11-05
Payer: MEDICARE

## 2020-11-05 VITALS
HEART RATE: 76 BPM | TEMPERATURE: 97.8 F | SYSTOLIC BLOOD PRESSURE: 128 MMHG | WEIGHT: 113 LBS | BODY MASS INDEX: 22.19 KG/M2 | HEIGHT: 60 IN | DIASTOLIC BLOOD PRESSURE: 68 MMHG

## 2020-11-05 DIAGNOSIS — M81.0 AGE-RELATED OSTEOPOROSIS WITHOUT CURRENT PATHOLOGICAL FRACTURE: Chronic | ICD-10-CM

## 2020-11-05 DIAGNOSIS — Z17.0 MALIGNANT NEOPLASM OF LOWER-OUTER QUADRANT OF LEFT BREAST OF FEMALE, ESTROGEN RECEPTOR POSITIVE (HCC): Chronic | ICD-10-CM

## 2020-11-05 DIAGNOSIS — C50.512 MALIGNANT NEOPLASM OF LOWER-OUTER QUADRANT OF LEFT BREAST OF FEMALE, ESTROGEN RECEPTOR POSITIVE (HCC): Chronic | ICD-10-CM

## 2020-11-05 DIAGNOSIS — I50.21 ACUTE SYSTOLIC HEART FAILURE (HCC): ICD-10-CM

## 2020-11-05 DIAGNOSIS — I49.1 PAC (PREMATURE ATRIAL CONTRACTION): ICD-10-CM

## 2020-11-05 DIAGNOSIS — I11.0 HYPERTENSIVE HEART DISEASE WITH ACUTE SYSTOLIC CONGESTIVE HEART FAILURE (HCC): ICD-10-CM

## 2020-11-05 DIAGNOSIS — Z23 NEED FOR INFLUENZA VACCINATION: Primary | ICD-10-CM

## 2020-11-05 DIAGNOSIS — I50.21 HYPERTENSIVE HEART DISEASE WITH ACUTE SYSTOLIC CONGESTIVE HEART FAILURE (HCC): ICD-10-CM

## 2020-11-05 DIAGNOSIS — I50.21 ACUTE SYSTOLIC CONGESTIVE HEART FAILURE (HCC): ICD-10-CM

## 2020-11-05 DIAGNOSIS — I25.10 CORONARY ARTERY DISEASE INVOLVING NATIVE HEART WITHOUT ANGINA PECTORIS, UNSPECIFIED VESSEL OR LESION TYPE: ICD-10-CM

## 2020-11-05 DIAGNOSIS — I21.9 MYOCARDIAL INFARCTION, UNSPECIFIED MI TYPE, UNSPECIFIED ARTERY (HCC): ICD-10-CM

## 2020-11-05 DIAGNOSIS — E05.90 HYPERTHYROIDISM: ICD-10-CM

## 2020-11-05 DIAGNOSIS — K21.9 GASTROESOPHAGEAL REFLUX DISEASE WITHOUT ESOPHAGITIS: ICD-10-CM

## 2020-11-05 PROBLEM — I21.4 NSTEMI (NON-ST ELEVATED MYOCARDIAL INFARCTION) (HCC): Status: ACTIVE | Noted: 2020-09-03

## 2020-11-05 PROBLEM — F32.9 MAJOR DEPRESSION: Status: ACTIVE | Noted: 2020-09-07

## 2020-11-05 PROBLEM — I11.9 HYPERTENSIVE HEART DISEASE: Status: ACTIVE | Noted: 2017-10-11

## 2020-11-05 PROBLEM — I50.9 CHF (CONGESTIVE HEART FAILURE) (HCC): Status: ACTIVE | Noted: 2020-11-05

## 2020-11-05 PROBLEM — E03.9 HYPOTHYROID: Status: ACTIVE | Noted: 2017-11-14

## 2020-11-05 PROCEDURE — 99214 OFFICE O/P EST MOD 30 MIN: CPT | Performed by: FAMILY MEDICINE

## 2020-11-05 PROCEDURE — G0008 ADMIN INFLUENZA VIRUS VAC: HCPCS | Performed by: FAMILY MEDICINE

## 2020-11-05 PROCEDURE — 90662 IIV NO PRSV INCREASED AG IM: CPT | Performed by: FAMILY MEDICINE

## 2020-11-05 PROCEDURE — G0438 PPPS, INITIAL VISIT: HCPCS | Performed by: FAMILY MEDICINE

## 2020-11-05 RX ORDER — CLOPIDOGREL BISULFATE 75 MG/1
TABLET ORAL
COMMUNITY
Start: 2020-10-02

## 2020-11-05 RX ORDER — ISOSORBIDE MONONITRATE 30 MG/1
TABLET, EXTENDED RELEASE ORAL
COMMUNITY
Start: 2020-10-02

## 2020-11-05 RX ORDER — PANTOPRAZOLE SODIUM 40 MG/1
TABLET, DELAYED RELEASE ORAL
COMMUNITY
Start: 2020-10-28

## 2020-11-05 RX ORDER — ASPIRIN 81 MG/1
81 TABLET, CHEWABLE ORAL DAILY
COMMUNITY
Start: 2020-10-02

## 2020-11-08 DIAGNOSIS — C50.512 MALIGNANT NEOPLASM OF LOWER-OUTER QUADRANT OF LEFT BREAST OF FEMALE, ESTROGEN RECEPTOR POSITIVE (HCC): ICD-10-CM

## 2020-11-08 DIAGNOSIS — Z17.0 MALIGNANT NEOPLASM OF LOWER-OUTER QUADRANT OF LEFT BREAST OF FEMALE, ESTROGEN RECEPTOR POSITIVE (HCC): ICD-10-CM

## 2020-11-09 RX ORDER — TAMOXIFEN CITRATE 20 MG/1
TABLET ORAL
Qty: 30 TABLET | Refills: 0 | Status: SHIPPED | OUTPATIENT
Start: 2020-11-09 | End: 2020-12-15 | Stop reason: SDUPTHER

## 2020-12-15 DIAGNOSIS — Z17.0 MALIGNANT NEOPLASM OF LOWER-OUTER QUADRANT OF LEFT BREAST OF FEMALE, ESTROGEN RECEPTOR POSITIVE (HCC): ICD-10-CM

## 2020-12-15 DIAGNOSIS — C50.512 MALIGNANT NEOPLASM OF LOWER-OUTER QUADRANT OF LEFT BREAST OF FEMALE, ESTROGEN RECEPTOR POSITIVE (HCC): ICD-10-CM

## 2020-12-15 RX ORDER — TAMOXIFEN CITRATE 20 MG/1
20 TABLET ORAL DAILY
Qty: 30 TABLET | Refills: 0 | Status: SHIPPED | OUTPATIENT
Start: 2020-12-15 | End: 2021-01-10

## 2021-01-10 DIAGNOSIS — Z17.0 MALIGNANT NEOPLASM OF LOWER-OUTER QUADRANT OF LEFT BREAST OF FEMALE, ESTROGEN RECEPTOR POSITIVE (HCC): ICD-10-CM

## 2021-01-10 DIAGNOSIS — C50.512 MALIGNANT NEOPLASM OF LOWER-OUTER QUADRANT OF LEFT BREAST OF FEMALE, ESTROGEN RECEPTOR POSITIVE (HCC): ICD-10-CM

## 2021-01-10 RX ORDER — TAMOXIFEN CITRATE 20 MG/1
TABLET ORAL
Qty: 30 TABLET | Refills: 0 | Status: SHIPPED | OUTPATIENT
Start: 2021-01-10

## 2021-01-24 DIAGNOSIS — R79.89 ABNORMAL THYROID STIMULATING HORMONE (TSH) LEVEL: ICD-10-CM

## 2021-01-25 RX ORDER — METHIMAZOLE 10 MG/1
10 TABLET ORAL EVERY MORNING
Qty: 30 TABLET | Refills: 0 | Status: SHIPPED | OUTPATIENT
Start: 2021-01-25 | End: 2021-02-17

## 2021-02-12 DIAGNOSIS — Z23 ENCOUNTER FOR IMMUNIZATION: ICD-10-CM

## 2021-02-15 ENCOUNTER — TELEPHONE (OUTPATIENT)
Dept: FAMILY MEDICINE CLINIC | Facility: CLINIC | Age: 81
End: 2021-02-15

## 2021-02-15 NOTE — TELEPHONE ENCOUNTER
Treasure Flores from [facility] called to see if Dr Sal Alex will sign off on home health orders for nursing & therapy for this pt? Please call Treasure Flores at [phone]  Thank you!

## 2021-02-17 DIAGNOSIS — R79.89 ABNORMAL THYROID STIMULATING HORMONE (TSH) LEVEL: ICD-10-CM

## 2021-02-17 RX ORDER — METHIMAZOLE 10 MG/1
TABLET ORAL
Qty: 30 TABLET | Refills: 0 | Status: SHIPPED | OUTPATIENT
Start: 2021-02-17 | End: 2021-03-24

## 2021-03-08 ENCOUNTER — PATIENT OUTREACH (OUTPATIENT)
Dept: FAMILY MEDICINE CLINIC | Facility: CLINIC | Age: 81
End: 2021-03-08

## 2021-03-08 ENCOUNTER — OFFICE VISIT (OUTPATIENT)
Dept: FAMILY MEDICINE CLINIC | Facility: CLINIC | Age: 81
End: 2021-03-08
Payer: MEDICARE

## 2021-03-08 VITALS
SYSTOLIC BLOOD PRESSURE: 100 MMHG | BODY MASS INDEX: 20.54 KG/M2 | HEART RATE: 100 BPM | WEIGHT: 104.6 LBS | DIASTOLIC BLOOD PRESSURE: 52 MMHG | HEIGHT: 60 IN | OXYGEN SATURATION: 96 %

## 2021-03-08 DIAGNOSIS — I50.22 HYPERTENSIVE HEART DISEASE WITH CHRONIC SYSTOLIC CONGESTIVE HEART FAILURE (HCC): ICD-10-CM

## 2021-03-08 DIAGNOSIS — I11.0 HYPERTENSIVE HEART DISEASE WITH CHRONIC SYSTOLIC CONGESTIVE HEART FAILURE (HCC): ICD-10-CM

## 2021-03-08 DIAGNOSIS — I50.22 CHRONIC SYSTOLIC CONGESTIVE HEART FAILURE (HCC): ICD-10-CM

## 2021-03-08 DIAGNOSIS — F33.0 MILD EPISODE OF RECURRENT MAJOR DEPRESSIVE DISORDER (HCC): ICD-10-CM

## 2021-03-08 DIAGNOSIS — R29.6 MULTIPLE FALLS: Primary | ICD-10-CM

## 2021-03-08 DIAGNOSIS — I25.10 CORONARY ARTERY DISEASE INVOLVING NATIVE HEART WITHOUT ANGINA PECTORIS, UNSPECIFIED VESSEL OR LESION TYPE: ICD-10-CM

## 2021-03-08 DIAGNOSIS — R91.8 PULMONARY NODULES: ICD-10-CM

## 2021-03-08 DIAGNOSIS — M81.0 AGE-RELATED OSTEOPOROSIS WITHOUT CURRENT PATHOLOGICAL FRACTURE: Chronic | ICD-10-CM

## 2021-03-08 PROBLEM — C50.512 MALIGNANT NEOPLASM OF LOWER-OUTER QUADRANT OF LEFT BREAST OF FEMALE, ESTROGEN RECEPTOR POSITIVE (HCC): Status: ACTIVE | Noted: 2017-10-11

## 2021-03-08 PROBLEM — E78.5 HYPERLIPIDEMIA: Status: ACTIVE | Noted: 2020-09-07

## 2021-03-08 PROBLEM — F32.A DEPRESSION: Status: ACTIVE | Noted: 2020-09-07

## 2021-03-08 PROBLEM — Z17.0 MALIGNANT NEOPLASM OF LOWER-OUTER QUADRANT OF LEFT BREAST OF FEMALE, ESTROGEN RECEPTOR POSITIVE (HCC): Status: ACTIVE | Noted: 2017-10-11

## 2021-03-08 PROBLEM — R94.31 QT PROLONGATION: Status: ACTIVE | Noted: 2021-02-23

## 2021-03-08 PROBLEM — S72.001D CLOSED FRACTURE OF RIGHT HIP WITH ROUTINE HEALING: Status: RESOLVED | Noted: 2018-02-09 | Resolved: 2021-03-08

## 2021-03-08 PROBLEM — J90 BILATERAL PLEURAL EFFUSION: Status: ACTIVE | Noted: 2021-02-23

## 2021-03-08 PROCEDURE — 99215 OFFICE O/P EST HI 40 MIN: CPT | Performed by: FAMILY MEDICINE

## 2021-03-08 RX ORDER — SERTRALINE HYDROCHLORIDE 25 MG/1
25 TABLET, FILM COATED ORAL DAILY
COMMUNITY
Start: 2021-02-12 | End: 2022-02-12

## 2021-03-08 RX ORDER — ATORVASTATIN CALCIUM 40 MG/1
TABLET, FILM COATED ORAL
COMMUNITY
Start: 2020-12-29

## 2021-03-08 RX ORDER — NITROGLYCERIN 0.4 MG/1
0.4 TABLET SUBLINGUAL
COMMUNITY
Start: 2021-01-25

## 2021-03-08 RX ORDER — FUROSEMIDE 20 MG/1
10 TABLET ORAL DAILY
COMMUNITY
Start: 2021-03-03 | End: 2021-04-02

## 2021-03-08 NOTE — PROGRESS NOTES
Assessment and Plan:    Problem List Items Addressed This Visit     Age-related osteoporosis without current pathological fracture     Patient is stable at this point with osteoporosis  Certainly, she is at increased risk for fractures, especially with her falls  She is currently receiving PT from [facility], but it appears that she is not doing well, and appears to be be compensating  CAD (coronary artery disease)     Follow with Cardiology  Relevant Medications    nitroglycerin (NITROSTAT) 0 4 mg SL tablet    Other Relevant Orders    Ambulatory referral to social work care management program    Ambulatory referral to complex care management program    CHF (congestive heart failure) ([facility] [address] )     Wt Readings from Last 3 Encounters:   03/08/21 47 4 kg (104 lb 9 6 oz)   11/05/20 51 3 kg (113 lb)   09/26/19 55 5 kg (122 lb 6 4 oz)     Heart failure seems to be somewhat worse  Unfortunately, she is not able to tolerate higher dose beta-blocker, nor ACE-inhibitor  She has had add increased Lasix dosing  Discussed Entresto, but I feel that would likely cause more problems with orthostasis and hypotension  Follow with Cardiology  No change at the moment  Try to limit sodium  Relevant Medications    nitroglycerin (NITROSTAT) 0 4 mg SL tablet    Other Relevant Orders    Ambulatory referral to social work care management program    Ambulatory referral to complex care management program    Depression     Patient had Zoloft started in the hospital   Currently on 25 mg  No change  Negative SI, positive contract  Relevant Medications    sertraline (ZOLOFT) 25 mg tablet    Hypertensive heart disease     Blood pressure remains low  Quite concerned about this  Son reports this is actually improved versus what it was before  She is going to run into trouble with this, as there is fluid overload, but low blood pressure  Follow with Cardiology           Relevant Medications    nitroglycerin (NITROSTAT) 0 4 mg SL tablet    Other Relevant Orders    Ambulatory referral to social work care management program    Ambulatory referral to complex care management program    Multiple falls - Primary     Patient has had multiple falls lately  Some of this is on the basis of medical issues with regard to blood pressure and heart failure, but there is also significant weakness  At this point, she has been doing physical therapy as an outpatient at home through [facility], but it has not been successful in any improvement  In fact, she seems to be compensating getting worse  I would agree that intensive inpatient would be the most reasonable option for her  I would fully support [facility] social work contacting any facility that would be accepting her for inpatient physical therapy  Beyond that, there may be some long-term placement issues  Again, the patient just seems to be D compensating medically and physiologically  Relevant Orders    Ambulatory referral to social work care management program    Ambulatory referral to complex care management program    Pulmonary nodules     Noted in hospital   Patient is due for follow-up CT at some point                          Diagnoses and all orders for this visit:    Multiple falls  -     Ambulatory referral to social work care management program; Future  -     Ambulatory referral to complex care management program; Future    Chronic systolic congestive heart failure ([facility] )  -     Ambulatory referral to social work care management program; Future  -     Ambulatory referral to complex care management program; Future    Coronary artery disease involving native heart without angina pectoris, unspecified vessel or lesion type  -     Ambulatory referral to social work care management program; Future  -     Ambulatory referral to complex care management program; Future    Hypertensive heart disease with chronic systolic congestive heart failure ([facility] )  -     Ambulatory referral to social work care management program; Future  -     Ambulatory referral to complex care management program; Future    Mild episode of recurrent major depressive disorder (HCC)    Age-related osteoporosis without current pathological fracture    Pulmonary nodules    Other orders  -     nitroglycerin (NITROSTAT) 0 4 mg SL tablet; Place 0 4 mg under the tongue  -     atorvastatin (LIPITOR) 40 mg tablet  -     furosemide (LASIX) 20 mg tablet; Take 10 mg by mouth daily  -     sertraline (ZOLOFT) 25 mg tablet; Take 25 mg by mouth daily              Subjective:      Patient ID: Law Witt is a 79 y o  female  CC:    Chief Complaint   Patient presents with    Fall     Pt was in the Hospital 2 times  She had fallen 2 times in the last week or so  They would like to discuss some inpatient Physical Therapy for Gait/ Mobility  kw       HPI:    Patient has had some falls lately, as well as some problems with weakness  She was discharged to home, as opposed to going to rehab  She now has had ER evals with the falls  July Cervantes at dinner table, and once in bathroom  Patient is weaker than before  PT/OT requested  She is getting PT 1 time a week for about 20 minutes  Patient is not being forced to do work when PT is there, and she is not taking care of ADL's  Can't/wont go up stairs for fear of falling  Therefore, not cleaning/showering  She has had some incontinence of stool with this  Spoke at length her son for this  CHF:  Patient is following with Cardiology quite frequently  CAD:  Again, patient following with Cardiology  Blood pressure today was reviewed  Her blood pressure had been quite low before  Metoprolol is now at 12 5 mg daily  During her recent hospitalization, the patient was started on Zoloft  This was for depression          The following portions of the patient's history were reviewed and updated as appropriate: allergies, current medications, past family history, past medical history, past social history, past surgical history and problem list       Review of Systems      Data to review:       Objective:    Vitals:    03/08/21 1415   BP: 100/52   BP Location: Left arm   Patient Position: Sitting   Pulse: 100   SpO2: 96%   Weight: 47 4 kg (104 lb 9 6 oz)   Height: 4' 11 5" (1 511 m)        Physical Exam

## 2021-03-08 NOTE — ASSESSMENT & PLAN NOTE
Blood pressure remains low  Quite concerned about this  Son reports this is actually improved versus what it was before  She is going to run into trouble with this, as there is fluid overload, but low blood pressure  Follow with Cardiology

## 2021-03-08 NOTE — PATIENT INSTRUCTIONS
Problem List Items Addressed This Visit     Age-related osteoporosis without current pathological fracture     Patient is stable at this point with osteoporosis  Certainly, she is at increased risk for fractures, especially with her falls  She is currently receiving PT from [facility], but it appears that she is not doing well, and appears to be be compensating  CAD (coronary artery disease)     Follow with Cardiology  Relevant Medications    nitroglycerin (NITROSTAT) 0 4 mg SL tablet    Other Relevant Orders    Ambulatory referral to social work care management program    Ambulatory referral to complex care management program    CHF (congestive heart failure) ([facility] [address] )     Wt Readings from Last 3 Encounters:   03/08/21 47 4 kg (104 lb 9 6 oz)   11/05/20 51 3 kg (113 lb)   09/26/19 55 5 kg (122 lb 6 4 oz)     Heart failure seems to be somewhat worse  Unfortunately, she is not able to tolerate higher dose beta-blocker, nor ACE-inhibitor  She has had add increased Lasix dosing  Discussed Entresto, but I feel that would likely cause more problems with orthostasis and hypotension  Follow with Cardiology  No change at the moment  Try to limit sodium  Relevant Medications    nitroglycerin (NITROSTAT) 0 4 mg SL tablet    Other Relevant Orders    Ambulatory referral to social work care management program    Ambulatory referral to complex care management program    Depression     Patient had Zoloft started in the hospital   Currently on 25 mg  No change  Negative SI, positive contract  Relevant Medications    sertraline (ZOLOFT) 25 mg tablet    Hypertensive heart disease     Blood pressure remains low  Quite concerned about this  Son reports this is actually improved versus what it was before  She is going to run into trouble with this, as there is fluid overload, but low blood pressure  Follow with Cardiology           Relevant Medications    nitroglycerin (NITROSTAT) 0 4 mg SL tablet Other Relevant Orders    Ambulatory referral to social work care management program    Ambulatory referral to complex care management program    Multiple falls - Primary     Patient has had multiple falls lately  Some of this is on the basis of medical issues with regard to blood pressure and heart failure, but there is also significant weakness  At this point, she has been doing physical therapy as an outpatient at home through [facility], but it has not been successful in any improvement  In fact, she seems to be compensating getting worse  I would agree that intensive inpatient would be the most reasonable option for her  I would fully support [facility] social work contacting any facility that would be accepting her for inpatient physical therapy  Beyond that, there may be some long-term placement issues  Again, the patient just seems to be D compensating medically and physiologically  Relevant Orders    Ambulatory referral to social work care management program    Ambulatory referral to complex care management program    Pulmonary nodules     Noted in hospital   Patient is due for follow-up CT at some point  COVID 19 Instructions    Miranda Vazquez was advised to limit contact with others to essential tasks such as getting food, medications, and medical care  Proper handwashing reviewed, and Hand sanitzer when washing is not available  If the patient develops symptoms of COVID 19, the patient should call the office as soon as possible  For 6036-5147 Flu season, it is strongly recommended that Flu Vaccinations be obtained  Please try to download [employer] Duo  Once you do download this on your phone, you will be prompted to add your phone number to the account  After that, he should receive a text from [email or web address], and use that code to verify your phone number  After that, you should be able to use Google Duo to receive and make video calls      Please download [employer] Teams to your phone or computer  We will be transitioning to this platform for Video Visits  Instructions for downloading this are available from the office  We are committed to getting you vaccinated as soon as possible and will be closely following CDC and [facility]  guidelines as they are released and revised  Please refer to our COVID-19 vaccine webpage for the most up to date information on the vaccine and our distribution efforts      [employer] tn

## 2021-03-08 NOTE — ASSESSMENT & PLAN NOTE
Patient is stable at this point with osteoporosis  Certainly, she is at increased risk for fractures, especially with her falls  She is currently receiving PT from [facility], but it appears that she is not doing well, and appears to be be compensating

## 2021-03-08 NOTE — PROGRESS NOTES
OP CM rcvd call from Dr Javy Mclean that pt is not doing well at home and son would like her to go to [facility] rehab  Call to [facility] and was transferred to [facility] x  and left message to see if they could admit the pt to rehab from home  Call to pt Murray Ruiz and he is agreeable to pt going to [facility] and states pt is familiar with this rehab and was at [facility] after her hip replacement  Explained that OP CM will call back son once I hear back from [facility]  Son is agreeable to other facilities if [facility] has no bed

## 2021-03-08 NOTE — ASSESSMENT & PLAN NOTE
Patient has had multiple falls lately  Some of this is on the basis of medical issues with regard to blood pressure and heart failure, but there is also significant weakness  At this point, she has been doing physical therapy as an outpatient at home through [facility], but it has not been successful in any improvement  In fact, she seems to be compensating getting worse  I would agree that intensive inpatient would be the most reasonable option for her  I would fully support [facility] social work contacting any facility that would be accepting her for inpatient physical therapy  Beyond that, there may be some long-term placement issues  Again, the patient just seems to be D compensating medically and physiologically

## 2021-03-08 NOTE — ASSESSMENT & PLAN NOTE
Patient had Zoloft started in the hospital   Currently on 25 mg  No change  Negative SI, positive contract

## 2021-03-08 NOTE — ASSESSMENT & PLAN NOTE
Wt Readings from Last 3 Encounters:   03/08/21 47 4 kg (104 lb 9 6 oz)   11/05/20 51 3 kg (113 lb)   09/26/19 55 5 kg (122 lb 6 4 oz)     Heart failure seems to be somewhat worse  Unfortunately, she is not able to tolerate higher dose beta-blocker, nor ACE-inhibitor  She has had add increased Lasix dosing  Discussed Entresto, but I feel that would likely cause more problems with orthostasis and hypotension  Follow with Cardiology  No change at the moment  Try to limit sodium

## 2021-03-09 ENCOUNTER — PATIENT OUTREACH (OUTPATIENT)
Dept: FAMILY MEDICINE CLINIC | Facility: CLINIC | Age: 81
End: 2021-03-09

## 2021-03-09 NOTE — PROGRESS NOTES
OP CM rcvd call from pts son that he had to call the ambulance this AM because pt fell again and she is now at [address] son will tell them he wants her placed and cannot take her home  Son aware OP CM is available if he has any further concerns/questions

## 2021-03-10 ENCOUNTER — TELEPHONE (OUTPATIENT)
Dept: FAMILY MEDICINE CLINIC | Facility: CLINIC | Age: 81
End: 2021-03-10

## 2021-03-24 DIAGNOSIS — R79.89 ABNORMAL THYROID STIMULATING HORMONE (TSH) LEVEL: ICD-10-CM

## 2021-03-24 RX ORDER — METHIMAZOLE 10 MG/1
TABLET ORAL
Qty: 30 TABLET | Refills: 2 | Status: SHIPPED | OUTPATIENT
Start: 2021-03-24